# Patient Record
Sex: MALE | Race: WHITE | NOT HISPANIC OR LATINO | Employment: OTHER | URBAN - METROPOLITAN AREA
[De-identification: names, ages, dates, MRNs, and addresses within clinical notes are randomized per-mention and may not be internally consistent; named-entity substitution may affect disease eponyms.]

---

## 2024-01-10 ENCOUNTER — OFFICE VISIT (OUTPATIENT)
Dept: PRIMARY CARE | Facility: CLINIC | Age: 74
End: 2024-01-10
Payer: MEDICARE

## 2024-01-10 VITALS
BODY MASS INDEX: 24.25 KG/M2 | OXYGEN SATURATION: 95 % | WEIGHT: 160 LBS | HEIGHT: 68 IN | HEART RATE: 83 BPM | DIASTOLIC BLOOD PRESSURE: 83 MMHG | SYSTOLIC BLOOD PRESSURE: 122 MMHG

## 2024-01-10 DIAGNOSIS — K21.9 GASTROESOPHAGEAL REFLUX DISEASE WITHOUT ESOPHAGITIS: ICD-10-CM

## 2024-01-10 DIAGNOSIS — G47.33 OSA (OBSTRUCTIVE SLEEP APNEA): ICD-10-CM

## 2024-01-10 DIAGNOSIS — Z79.4 TYPE 2 DIABETES MELLITUS WITH OTHER SPECIFIED COMPLICATION, WITH LONG-TERM CURRENT USE OF INSULIN (MULTI): ICD-10-CM

## 2024-01-10 DIAGNOSIS — Z00.00 ROUTINE GENERAL MEDICAL EXAMINATION AT HEALTH CARE FACILITY: ICD-10-CM

## 2024-01-10 DIAGNOSIS — E11.69 TYPE 2 DIABETES MELLITUS WITH OTHER SPECIFIED COMPLICATION, WITH LONG-TERM CURRENT USE OF INSULIN (MULTI): ICD-10-CM

## 2024-01-10 DIAGNOSIS — E08.00 DIAB D/T UNDRL COND W HYPROSM W/O NONKET HYPRGLY-HYPROS COMA (MULTI): ICD-10-CM

## 2024-01-10 DIAGNOSIS — I25.810 CORONARY ARTERY DISEASE INVOLVING AUTOLOGOUS VEIN CORONARY BYPASS GRAFT WITHOUT ANGINA PECTORIS: ICD-10-CM

## 2024-01-10 DIAGNOSIS — E55.9 VITAMIN D DEFICIENCY: ICD-10-CM

## 2024-01-10 DIAGNOSIS — G63 POLYNEUROPATHY ASSOCIATED WITH UNDERLYING DISEASE (MULTI): ICD-10-CM

## 2024-01-10 DIAGNOSIS — Z00.00 MEDICARE ANNUAL WELLNESS VISIT, SUBSEQUENT: Primary | ICD-10-CM

## 2024-01-10 PROBLEM — E11.9 DIABETES (MULTI): Status: ACTIVE | Noted: 2024-01-10

## 2024-01-10 PROBLEM — M50.00 CERVICAL DISC DISORDER WITH MYELOPATHY: Status: ACTIVE | Noted: 2024-01-10

## 2024-01-10 PROBLEM — M79.673 FOOT PAIN: Status: ACTIVE | Noted: 2024-01-10

## 2024-01-10 PROBLEM — Z95.1 S/P CABG X 4: Status: ACTIVE | Noted: 2024-01-10

## 2024-01-10 PROBLEM — R53.1 GENERALIZED WEAKNESS: Status: ACTIVE | Noted: 2024-01-10

## 2024-01-10 PROBLEM — M54.16 LUMBAR RADICULAR PAIN: Status: ACTIVE | Noted: 2024-01-10

## 2024-01-10 PROBLEM — I25.10 CAD (CORONARY ARTERY DISEASE): Status: ACTIVE | Noted: 2024-01-10

## 2024-01-10 PROBLEM — G62.9 PERIPHERAL NEUROPATHY: Status: ACTIVE | Noted: 2024-01-10

## 2024-01-10 PROBLEM — R29.898 WEAKNESS OF BOTH LOWER EXTREMITIES: Status: ACTIVE | Noted: 2024-01-10

## 2024-01-10 PROBLEM — M79.604 PAIN IN LATERAL RIGHT LOWER EXTREMITY: Status: ACTIVE | Noted: 2024-01-10

## 2024-01-10 PROCEDURE — 1170F FXNL STATUS ASSESSED: CPT | Performed by: NURSE PRACTITIONER

## 2024-01-10 PROCEDURE — 3074F SYST BP LT 130 MM HG: CPT | Performed by: NURSE PRACTITIONER

## 2024-01-10 PROCEDURE — 1159F MED LIST DOCD IN RCRD: CPT | Performed by: NURSE PRACTITIONER

## 2024-01-10 PROCEDURE — G0439 PPPS, SUBSEQ VISIT: HCPCS | Performed by: NURSE PRACTITIONER

## 2024-01-10 PROCEDURE — 3079F DIAST BP 80-89 MM HG: CPT | Performed by: NURSE PRACTITIONER

## 2024-01-10 PROCEDURE — 99214 OFFICE O/P EST MOD 30 MIN: CPT | Performed by: NURSE PRACTITIONER

## 2024-01-10 PROCEDURE — 1036F TOBACCO NON-USER: CPT | Performed by: NURSE PRACTITIONER

## 2024-01-10 RX ORDER — HUMAN INSULIN 100 [USP'U]/ML
INJECTION, SUSPENSION SUBCUTANEOUS
COMMUNITY
Start: 2021-07-29 | End: 2024-01-10 | Stop reason: ALTCHOICE

## 2024-01-10 RX ORDER — NAPROXEN SODIUM 220 MG/1
81 TABLET, FILM COATED ORAL DAILY
Qty: 30 TABLET | Refills: 11 | Status: SHIPPED | OUTPATIENT
Start: 2024-01-10 | End: 2025-01-09

## 2024-01-10 RX ORDER — LANOLIN ALCOHOL/MO/W.PET/CERES
1000 CREAM (GRAM) TOPICAL
COMMUNITY
End: 2024-02-07 | Stop reason: ALTCHOICE

## 2024-01-10 RX ORDER — OMEPRAZOLE 20 MG/1
20 CAPSULE, DELAYED RELEASE ORAL DAILY
Qty: 30 CAPSULE | Refills: 5 | Status: SHIPPED | OUTPATIENT
Start: 2024-01-10 | End: 2024-07-08

## 2024-01-10 RX ORDER — GABAPENTIN 100 MG/1
100 CAPSULE ORAL 3 TIMES DAILY PRN
COMMUNITY
End: 2024-01-10 | Stop reason: SDUPTHER

## 2024-01-10 RX ORDER — METFORMIN HYDROCHLORIDE 500 MG/1
500 TABLET ORAL
Qty: 90 TABLET | Refills: 11 | Status: SHIPPED | OUTPATIENT
Start: 2024-01-10 | End: 2024-02-07 | Stop reason: SDUPTHER

## 2024-01-10 RX ORDER — GABAPENTIN 100 MG/1
100 CAPSULE ORAL 3 TIMES DAILY
Qty: 90 CAPSULE | Refills: 1 | Status: SHIPPED | OUTPATIENT
Start: 2024-01-10 | End: 2024-04-10 | Stop reason: SDUPTHER

## 2024-01-10 ASSESSMENT — ENCOUNTER SYMPTOMS
DIARRHEA: 0
CHILLS: 0
SHORTNESS OF BREATH: 0
FEVER: 0
VOICE CHANGE: 1
FATIGUE: 1
SORE THROAT: 1
NECK PAIN: 1
NAUSEA: 0
PALPITATIONS: 0
WEAKNESS: 1
CONSTIPATION: 0
PSYCHIATRIC NEGATIVE: 1
VOMITING: 0
COUGH: 0
ABDOMINAL PAIN: 0
NUMBNESS: 1
ARTHRALGIAS: 1
DIZZINESS: 0

## 2024-01-10 ASSESSMENT — ACTIVITIES OF DAILY LIVING (ADL)
GROCERY_SHOPPING: INDEPENDENT
DOING_HOUSEWORK: INDEPENDENT
BATHING: INDEPENDENT
DRESSING: INDEPENDENT
TAKING_MEDICATION: INDEPENDENT
MANAGING_FINANCES: INDEPENDENT

## 2024-01-10 ASSESSMENT — PATIENT HEALTH QUESTIONNAIRE - PHQ9
2. FEELING DOWN, DEPRESSED OR HOPELESS: NOT AT ALL
1. LITTLE INTEREST OR PLEASURE IN DOING THINGS: NOT AT ALL
SUM OF ALL RESPONSES TO PHQ9 QUESTIONS 1 AND 2: 0

## 2024-01-10 NOTE — ASSESSMENT & PLAN NOTE
Stop Insulin  Start metformin and jardiance as prescribed  Continue to check sugar atleast twice daily and record.

## 2024-01-10 NOTE — PROGRESS NOTES
"Subjective   Patient ID: Jay Rebolledo is a 73 y.o. male who presents for medicare wellness and annual exam.      HPI   Needs to have back surgery.  Will not perform procedure with elevated A1c.  Here today with wife.  Complaints of feeling tired, generally not feeling good, low energy.  Denies chest pain, palpitations, dizziness,  or GI issues.  Does have some exertional SOB. Not all the time.  States he has a sore, raspy throat for about 3 weeks.  Does not bother him when swallowing food or drink, only involuntary swallowing.  Both feet have been bothering him for a while now, numbness and tingling.  Taking insulin for sugar, not well controlled, lots of highs and lows.  Has not been taking any medications for some time, does not like taking pills.  Using heating pad on his feet for comfort from the N/T.  Not compliant with CPAP    Review of Systems   Constitutional:  Positive for fatigue. Negative for chills and fever.   HENT:  Positive for sore throat and voice change. Negative for congestion and ear pain.    Eyes:  Negative for visual disturbance.   Respiratory:  Negative for cough and shortness of breath.    Cardiovascular:  Negative for chest pain, palpitations and leg swelling.   Gastrointestinal:  Negative for abdominal pain, constipation, diarrhea, nausea and vomiting.   Genitourinary: Negative.    Musculoskeletal:  Positive for arthralgias and neck pain.   Skin:  Negative for rash.   Neurological:  Positive for weakness and numbness. Negative for dizziness.   Psychiatric/Behavioral: Negative.         Objective   /83   Pulse 83   Ht 1.727 m (5' 8\")   Wt 72.6 kg (160 lb)   SpO2 95%   BMI 24.33 kg/m²     Physical Exam  Constitutional:       General: He is not in acute distress.     Appearance: Normal appearance.   HENT:      Head: Normocephalic and atraumatic.      Right Ear: Tympanic membrane, ear canal and external ear normal.      Left Ear: Tympanic membrane, ear canal and external ear normal. "      Nose: Nose normal.      Mouth/Throat:      Mouth: Mucous membranes are moist.      Pharynx: Oropharynx is clear.   Eyes:      Extraocular Movements: Extraocular movements intact.      Conjunctiva/sclera: Conjunctivae normal.      Pupils: Pupils are equal, round, and reactive to light.   Cardiovascular:      Rate and Rhythm: Normal rate and regular rhythm.      Pulses: Normal pulses.      Heart sounds: Normal heart sounds. No murmur heard.  Pulmonary:      Effort: Pulmonary effort is normal.      Breath sounds: Normal breath sounds. No wheezing, rhonchi or rales.   Abdominal:      General: Bowel sounds are normal.      Palpations: Abdomen is soft.      Tenderness: There is no abdominal tenderness.   Musculoskeletal:         General: Normal range of motion.      Cervical back: Normal range of motion and neck supple.   Lymphadenopathy:      Comments: No lymphadenopathy noted   Skin:     General: Skin is warm and dry.      Findings: No rash.   Neurological:      General: No focal deficit present.      Mental Status: He is alert and oriented to person, place, and time.      Cranial Nerves: No cranial nerve deficit.      Coordination: Coordination normal.      Gait: Gait normal.   Psychiatric:         Mood and Affect: Mood normal.         Behavior: Behavior normal.         Assessment/Plan   Problem List Items Addressed This Visit             ICD-10-CM    CAD (coronary artery disease) I25.10     Start Aspirin 81 mg daily         Relevant Medications    aspirin 81 mg chewable tablet    Diabetes (CMS/HCC) E11.9     Stop Insulin  Start metformin and jardiance as prescribed  Continue to check sugar atleast twice daily and record.         CAYLA (obstructive sleep apnea) G47.33    Peripheral neuropathy G62.9     Continue neurontin as prescribed, as needed  DO NOT use heating pad on feet.  May cause burns.  Utilize other options we discussed, warming socks, warm blanket         Relevant Medications    gabapentin (Neurontin)  100 mg capsule    Medicare annual wellness visit, subsequent - Primary Z00.00    Gastroesophageal reflux disease without esophagitis K21.9     Start omeprazole as prescribed.         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

## 2024-01-10 NOTE — ASSESSMENT & PLAN NOTE
Continue neurontin as prescribed, as needed  DO NOT use heating pad on feet.  May cause burns.  Utilize other options we discussed, warming socks, warm blanket

## 2024-01-11 ENCOUNTER — HOSPITAL ENCOUNTER (EMERGENCY)
Facility: HOSPITAL | Age: 74
Discharge: HOME | End: 2024-01-11
Attending: STUDENT IN AN ORGANIZED HEALTH CARE EDUCATION/TRAINING PROGRAM
Payer: MEDICARE

## 2024-01-11 ENCOUNTER — NURSE TRIAGE (OUTPATIENT)
Dept: UROLOGY | Facility: CLINIC | Age: 74
End: 2024-01-11

## 2024-01-11 VITALS
BODY MASS INDEX: 24.25 KG/M2 | HEIGHT: 68 IN | HEART RATE: 87 BPM | DIASTOLIC BLOOD PRESSURE: 69 MMHG | RESPIRATION RATE: 18 BRPM | SYSTOLIC BLOOD PRESSURE: 125 MMHG | WEIGHT: 160 LBS | OXYGEN SATURATION: 97 %

## 2024-01-11 DIAGNOSIS — K59.00 CONSTIPATION, UNSPECIFIED CONSTIPATION TYPE: ICD-10-CM

## 2024-01-11 DIAGNOSIS — R33.9 URINARY RETENTION: Primary | ICD-10-CM

## 2024-01-11 LAB
ANION GAP BLDV CALCULATED.4IONS-SCNC: 13 MMOL/L (ref 10–25)
ANION GAP SERPL CALC-SCNC: 14 MMOL/L (ref 10–20)
BASE EXCESS BLDV CALC-SCNC: 1.6 MMOL/L (ref -2–3)
BASOPHILS # BLD AUTO: 0.08 X10*3/UL (ref 0–0.1)
BASOPHILS NFR BLD AUTO: 0.6 %
BODY TEMPERATURE: ABNORMAL
BUN SERPL-MCNC: 21 MG/DL (ref 6–23)
CA-I BLDV-SCNC: 1.06 MMOL/L (ref 1.1–1.33)
CALCIUM SERPL-MCNC: 8.4 MG/DL (ref 8.6–10.3)
CHLORIDE BLDV-SCNC: 94 MMOL/L (ref 98–107)
CHLORIDE SERPL-SCNC: 100 MMOL/L (ref 98–107)
CO2 SERPL-SCNC: 24 MMOL/L (ref 21–32)
CREAT SERPL-MCNC: 0.93 MG/DL (ref 0.5–1.3)
EGFRCR SERPLBLD CKD-EPI 2021: 88 ML/MIN/1.73M*2
EOSINOPHIL # BLD AUTO: 0.14 X10*3/UL (ref 0–0.4)
EOSINOPHIL NFR BLD AUTO: 1.1 %
ERYTHROCYTE [DISTWIDTH] IN BLOOD BY AUTOMATED COUNT: 12.9 % (ref 11.5–14.5)
GLUCOSE BLD MANUAL STRIP-MCNC: 419 MG/DL (ref 74–99)
GLUCOSE BLD MANUAL STRIP-MCNC: 470 MG/DL (ref 74–99)
GLUCOSE BLDV-MCNC: 547 MG/DL (ref 74–99)
GLUCOSE SERPL-MCNC: 443 MG/DL (ref 74–99)
HCO3 BLDV-SCNC: 26.6 MMOL/L (ref 22–26)
HCT VFR BLD AUTO: 47.7 % (ref 41–52)
HCT VFR BLD EST: 58 % (ref 41–52)
HGB BLD-MCNC: 15.7 G/DL (ref 13.5–17.5)
HGB BLDV-MCNC: 19.3 G/DL (ref 13.5–17.5)
IMM GRANULOCYTES # BLD AUTO: 0.05 X10*3/UL (ref 0–0.5)
IMM GRANULOCYTES NFR BLD AUTO: 0.4 % (ref 0–0.9)
INHALED O2 CONCENTRATION: 0 %
LACTATE BLDV-SCNC: 3.3 MMOL/L (ref 0.4–2)
LACTATE SERPL-SCNC: 1.8 MMOL/L (ref 0.4–2)
LYMPHOCYTES # BLD AUTO: 1.98 X10*3/UL (ref 0.8–3)
LYMPHOCYTES NFR BLD AUTO: 15.3 %
MCH RBC QN AUTO: 28.9 PG (ref 26–34)
MCHC RBC AUTO-ENTMCNC: 32.9 G/DL (ref 32–36)
MCV RBC AUTO: 88 FL (ref 80–100)
MONOCYTES # BLD AUTO: 0.82 X10*3/UL (ref 0.05–0.8)
MONOCYTES NFR BLD AUTO: 6.4 %
NEUTROPHILS # BLD AUTO: 9.84 X10*3/UL (ref 1.6–5.5)
NEUTROPHILS NFR BLD AUTO: 76.2 %
NRBC BLD-RTO: 0 /100 WBCS (ref 0–0)
OXYHGB MFR BLDV: 82.5 % (ref 45–75)
PCO2 BLDV: 42 MM HG (ref 41–51)
PH BLDV: 7.41 PH (ref 7.33–7.43)
PLATELET # BLD AUTO: 178 X10*3/UL (ref 150–450)
PO2 BLDV: 54 MM HG (ref 35–45)
POTASSIUM BLDV-SCNC: 9 MMOL/L (ref 3.5–5.3)
POTASSIUM SERPL-SCNC: 5 MMOL/L (ref 3.5–5.3)
RBC # BLD AUTO: 5.44 X10*6/UL (ref 4.5–5.9)
SAO2 % BLDV: 84 % (ref 45–75)
SODIUM BLDV-SCNC: 125 MMOL/L (ref 136–145)
SODIUM SERPL-SCNC: 133 MMOL/L (ref 136–145)
WBC # BLD AUTO: 12.9 X10*3/UL (ref 4.4–11.3)

## 2024-01-11 PROCEDURE — 82947 ASSAY GLUCOSE BLOOD QUANT: CPT | Mod: 59

## 2024-01-11 PROCEDURE — 99283 EMERGENCY DEPT VISIT LOW MDM: CPT | Mod: 25

## 2024-01-11 PROCEDURE — 85025 COMPLETE CBC W/AUTO DIFF WBC: CPT | Performed by: STUDENT IN AN ORGANIZED HEALTH CARE EDUCATION/TRAINING PROGRAM

## 2024-01-11 PROCEDURE — 36415 COLL VENOUS BLD VENIPUNCTURE: CPT | Performed by: STUDENT IN AN ORGANIZED HEALTH CARE EDUCATION/TRAINING PROGRAM

## 2024-01-11 PROCEDURE — 83605 ASSAY OF LACTIC ACID: CPT | Performed by: STUDENT IN AN ORGANIZED HEALTH CARE EDUCATION/TRAINING PROGRAM

## 2024-01-11 PROCEDURE — 84132 ASSAY OF SERUM POTASSIUM: CPT | Performed by: STUDENT IN AN ORGANIZED HEALTH CARE EDUCATION/TRAINING PROGRAM

## 2024-01-11 PROCEDURE — 2500000002 HC RX 250 W HCPCS SELF ADMINISTERED DRUGS (ALT 637 FOR MEDICARE OP, ALT 636 FOR OP/ED): Performed by: STUDENT IN AN ORGANIZED HEALTH CARE EDUCATION/TRAINING PROGRAM

## 2024-01-11 PROCEDURE — 51798 US URINE CAPACITY MEASURE: CPT

## 2024-01-11 PROCEDURE — 2500000005 HC RX 250 GENERAL PHARMACY W/O HCPCS: Performed by: STUDENT IN AN ORGANIZED HEALTH CARE EDUCATION/TRAINING PROGRAM

## 2024-01-11 PROCEDURE — 2500000004 HC RX 250 GENERAL PHARMACY W/ HCPCS (ALT 636 FOR OP/ED): Performed by: STUDENT IN AN ORGANIZED HEALTH CARE EDUCATION/TRAINING PROGRAM

## 2024-01-11 PROCEDURE — 96374 THER/PROPH/DIAG INJ IV PUSH: CPT

## 2024-01-11 PROCEDURE — 51702 INSERT TEMP BLADDER CATH: CPT

## 2024-01-11 PROCEDURE — 99284 EMERGENCY DEPT VISIT MOD MDM: CPT | Performed by: STUDENT IN AN ORGANIZED HEALTH CARE EDUCATION/TRAINING PROGRAM

## 2024-01-11 PROCEDURE — 96361 HYDRATE IV INFUSION ADD-ON: CPT

## 2024-01-11 RX ORDER — LIDOCAINE HYDROCHLORIDE 20 MG/ML
1 JELLY TOPICAL ONCE
Status: DISCONTINUED | OUTPATIENT
Start: 2024-01-11 | End: 2024-01-11 | Stop reason: HOSPADM

## 2024-01-11 RX ADMIN — SODIUM CHLORIDE 1000 ML: 9 INJECTION, SOLUTION INTRAVENOUS at 02:58

## 2024-01-11 RX ADMIN — SODIUM PHOSPHATE 1 ENEMA: 7; 19 ENEMA RECTAL at 02:08

## 2024-01-11 RX ADMIN — INSULIN HUMAN 5 UNITS: 100 INJECTION, SOLUTION PARENTERAL at 04:12

## 2024-01-11 ASSESSMENT — PAIN - FUNCTIONAL ASSESSMENT: PAIN_FUNCTIONAL_ASSESSMENT: 0-10

## 2024-01-11 ASSESSMENT — LIFESTYLE VARIABLES
HAVE YOU EVER FELT YOU SHOULD CUT DOWN ON YOUR DRINKING: NO
REASON UNABLE TO ASSESS: NO
EVER HAD A DRINK FIRST THING IN THE MORNING TO STEADY YOUR NERVES TO GET RID OF A HANGOVER: NO
HAVE PEOPLE ANNOYED YOU BY CRITICIZING YOUR DRINKING: NO
EVER FELT BAD OR GUILTY ABOUT YOUR DRINKING: NO

## 2024-01-11 ASSESSMENT — COLUMBIA-SUICIDE SEVERITY RATING SCALE - C-SSRS
6. HAVE YOU EVER DONE ANYTHING, STARTED TO DO ANYTHING, OR PREPARED TO DO ANYTHING TO END YOUR LIFE?: NO
2. HAVE YOU ACTUALLY HAD ANY THOUGHTS OF KILLING YOURSELF?: NO
1. IN THE PAST MONTH, HAVE YOU WISHED YOU WERE DEAD OR WISHED YOU COULD GO TO SLEEP AND NOT WAKE UP?: NO

## 2024-01-11 ASSESSMENT — PAIN SCALES - GENERAL: PAINLEVEL_OUTOF10: 10 - WORST POSSIBLE PAIN

## 2024-01-11 NOTE — ED NOTES
Pt came into the ED tonight due to he is having severe ABD pain-  He states he has not had a BM for 2 days and drank a lot of water trying to get things moving and now he is not able to urinate.       Cristian Vines RN  01/11/24 0116

## 2024-01-11 NOTE — TELEPHONE ENCOUNTER
Regarding: ER FOLLOW UP  ----- Message from Denise Cantu sent at 1/11/2024  8:25 AM EST -----  PATIENT WAS IN ER LAST NIGHT, HE WAS INSTRUCTED TO BE SEEN TODAY BY DR. YE. CAN YOU PLEASE CALL PATIENT AND GET HIM ON THE SCHEDULE?

## 2024-01-11 NOTE — ED PROVIDER NOTES
History/Exam limitations: none  HPI was provided by patient    HPI:    Chief Complaint   Patient presents with    Urinary Retention        Jay Rebolledo is a 73 y.o. male presents with chief complaint of hypertension.  Patient has not been able to urinate today.  This occurred just prior to arrival.  Admits to some suprapubic tenderness.  Also been having trouble with constipation for the past 2 days.  Denies any back pain any perianal anesthesia.  Has not tried anything prior to arrival.     ROS:All other review of systems are negative except as noted above and HPI or ROS.   CONSTITUTIONAL: fever, chills  CARDIOVASCULAR: chest pain, palpitations, swelling  RESPIRATORY: cough, wheeze, shortness of breath  GI: nausea, vomiting, diarrhea, abdominal pain  GENITOURINARY: dysuria, hematuria, frequency  SKIN: rash, lesion  NEUROLOGIC: headache, numbness, focal weakness  NOTES: All systems reviewed, negative except as described above       Physical Exam:  GENERAL: Alert, oriented , cooperative    HEAD: normocephalic, atraumatic    SKIN:  dry skin, no lesions.     NECK: Supple, without meningismus. Trachea at midline. No lymphadenopathy.    PULMONARY: Clear bilaterally. No crackles, rhonchi, wheezing.  No respiratory distress.  No work of breathing.    CARDIAC: Regular rate and regular rhythm.  Pulses 2+ in radials and dorsal pedal pulses bilaterally.  No murmur, rub, gallop.  No edema.    ABDOMEN: Soft, prepubic tenderness to palpation, active bowel sounds.  No palpable organomegaly.  No rebound or guarding.  No CVA tenderness.  No pulsatile masses.    NEUROLOGICAL:   no focal neuro deficits.  neurovascular intact in bilateral upper and lower extremities    PSYCHIATRIC: Appropriate mood and affect. Calm.       MDM/ED COURSE:        The patient presented for evaluation of urinary retention.  Bladder scan performed had about 500 cc of fluid.  Urinary catheter placed by nursing staff and had complete resolution of his bladder's  last abdominal pain symptoms.  He wanted to try a Fleet enema here for his constipation.  He was given.  The patient  has stable vs and will be discharge home.   The patient and caregiver are in agreement with the plan and given instructions to follow up with urology and PCP       External Records Reviewed: I reviewed recent and relevant outside records      I discussed the differential, results and plan with the patient and/or family/friend/caregiver if present.       I emphasized the importance of follow-up with the physician I referred them to in the timeframe recommended.  I explained reasons for the patient to return to the Emergency Department. Additional verbal discharge instructions were also given and discussed with the patient to supplement those generated by the EMR. We also discussed medications that were prescribed (if any) including common side effects and interactions. The patient was advised to abstain from driving, operating heavy machinery or making significant decisions while taking medications such as opiates and muscle relaxers that may impair this. All questions were addressed.  They understand return precautions and discharge instructions. The patient and/or family/friend/caregiver expressed understanding.     Note: This note was dictated by speech recognition. Minor errors in transcription may be present.    ED Course as of 01/11/24 0418   Thu Jan 11, 2024   0232 After getting an enema.  He then stated he did not feel well blood glucose was checked and he was found to be upwards of 470.  Will get blood work to check if he is in DKA. [WL]   0358 Was found to be hyperglycemic here but not in DKA.  Will give him a bolus of fluid along with 5 units of insulin.  Advised him to follow-up with his primary care doctor to have his blood sugar and medication regimen rechecked. [WL]   0418 Patient on reevaluation states he is ready to go home.  Potassium VBG is elevated but likely hemolyzed as the  actual potassium is not elevated on BMP. [WL]      ED Course User Index  [WL] Curly Fleming DO         Diagnoses as of 01/11/24 0418   Urinary retention   Constipation, unspecified constipation type         Past Medical History:   Diagnosis Date    Other conditions influencing health status 03/03/2022    History of cough      Social History     Socioeconomic History    Marital status:      Spouse name: Not on file    Number of children: Not on file    Years of education: Not on file    Highest education level: Not on file   Occupational History    Not on file   Tobacco Use    Smoking status: Never    Smokeless tobacco: Never   Vaping Use    Vaping Use: Never used   Substance and Sexual Activity    Alcohol use: Yes     Comment: socially    Drug use: Never    Sexual activity: Not on file   Other Topics Concern    Not on file   Social History Narrative    Not on file     Social Determinants of Health     Financial Resource Strain: Not on file   Food Insecurity: Not on file   Transportation Needs: Not on file   Physical Activity: Not on file   Stress: Not on file   Social Connections: Not on file   Intimate Partner Violence: Not on file   Housing Stability: Not on file     Current Outpatient Medications   Medication Instructions    aspirin 81 mg, oral, Daily    bisacodyl (FLEET BISACODYL) 10 mg, rectal, Once    cyanocobalamin (VITAMIN B-12) 1,000 mcg, oral, Daily RT    empagliflozin (JARDIANCE) 10 mg, oral, Daily    gabapentin (NEURONTIN) 100 mg, oral, 3 times daily    metFORMIN (GLUCOPHAGE) 500 mg, oral, 3 times daily with meals    omeprazole (PRILOSEC) 20 mg, oral, Daily, Do not crush or chew.     Allergies   Allergen Reactions    Penicillins Hives and Rash     and sycope as a child             ED Triage Vitals [01/11/24 0047]   Temp Heart Rate Resp BP   -- 87 18 (!) 161/98      SpO2 Temp src Heart Rate Source Patient Position   96 % -- -- --      BP Location FiO2 (%)     -- --               Labs and  Imaging  No orders to display     Labs Reviewed   CBC WITH AUTO DIFFERENTIAL - Abnormal       Result Value    WBC 12.9 (*)     nRBC 0.0      RBC 5.44      Hemoglobin 15.7      Hematocrit 47.7      MCV 88      MCH 28.9      MCHC 32.9      RDW 12.9      Platelets 178      Neutrophils % 76.2      Immature Granulocytes %, Automated 0.4      Lymphocytes % 15.3      Monocytes % 6.4      Eosinophils % 1.1      Basophils % 0.6      Neutrophils Absolute 9.84 (*)     Immature Granulocytes Absolute, Automated 0.05      Lymphocytes Absolute 1.98      Monocytes Absolute 0.82 (*)     Eosinophils Absolute 0.14      Basophils Absolute 0.08     BASIC METABOLIC PANEL - Abnormal    Glucose 443 (*)     Sodium 133 (*)     Potassium 5.0      Chloride 100      Bicarbonate 24      Anion Gap 14      Urea Nitrogen 21      Creatinine 0.93      eGFR 88      Calcium 8.4 (*)    BLOOD GAS VENOUS FULL PANEL - Abnormal    POCT pH, Venous 7.41      POCT pCO2, Venous 42      POCT pO2, Venous 54 (*)     POCT SO2, Venous 84 (*)     POCT Oxy Hemoglobin, Venous 82.5 (*)     POCT Hematocrit Calculated, Venous 58.0 (*)     POCT Sodium, Venous 125 (*)     POCT Potassium, Venous 9.0 (*)     POCT Chloride, Venous 94 (*)     POCT Ionized Calicum, Venous 1.06 (*)     POCT Glucose, Venous 547 (*)     POCT Lactate, Venous 3.3 (*)     POCT Base Excess, Venous 1.6      POCT HCO3 Calculated, Venous 26.6 (*)     POCT Hemoglobin, Venous 19.3 (*)     POCT Anion Gap, Venous 13.0      Patient Temperature        FiO2 0     POCT GLUCOSE - Abnormal    POCT Glucose 470 (*)    POCT GLUCOSE - Abnormal    POCT Glucose 419 (*)    LACTATE - Normal    Lactate 1.8      Narrative:     Venipuncture immediately after or during the administration of Metamizole may lead to falsely low results. Testing should be performed immediately  prior to Metamizole dosing.   BLOOD GAS LACTIC ACID, VENOUS   POCT FINGERSTICK GLUCOSE           Procedure  Procedures                  Curly Fleming,  DO  01/11/24 0418

## 2024-01-12 ENCOUNTER — OFFICE VISIT (OUTPATIENT)
Dept: UROLOGY | Facility: CLINIC | Age: 74
End: 2024-01-12
Payer: MEDICARE

## 2024-01-12 DIAGNOSIS — N40.1 BENIGN PROSTATIC HYPERPLASIA WITH URINARY RETENTION: Primary | ICD-10-CM

## 2024-01-12 DIAGNOSIS — R33.9 URINE RETENTION: ICD-10-CM

## 2024-01-12 DIAGNOSIS — R33.8 BENIGN PROSTATIC HYPERPLASIA WITH URINARY RETENTION: Primary | ICD-10-CM

## 2024-01-12 PROCEDURE — 99204 OFFICE O/P NEW MOD 45 MIN: CPT | Performed by: STUDENT IN AN ORGANIZED HEALTH CARE EDUCATION/TRAINING PROGRAM

## 2024-01-12 PROCEDURE — 1159F MED LIST DOCD IN RCRD: CPT | Performed by: STUDENT IN AN ORGANIZED HEALTH CARE EDUCATION/TRAINING PROGRAM

## 2024-01-12 PROCEDURE — 1125F AMNT PAIN NOTED PAIN PRSNT: CPT | Performed by: STUDENT IN AN ORGANIZED HEALTH CARE EDUCATION/TRAINING PROGRAM

## 2024-01-12 PROCEDURE — 1036F TOBACCO NON-USER: CPT | Performed by: STUDENT IN AN ORGANIZED HEALTH CARE EDUCATION/TRAINING PROGRAM

## 2024-01-12 PROCEDURE — 51798 US URINE CAPACITY MEASURE: CPT | Performed by: STUDENT IN AN ORGANIZED HEALTH CARE EDUCATION/TRAINING PROGRAM

## 2024-01-12 RX ORDER — TAMSULOSIN HYDROCHLORIDE 0.4 MG/1
0.4 CAPSULE ORAL DAILY
Qty: 30 CAPSULE | Refills: 11 | Status: SHIPPED | OUTPATIENT
Start: 2024-01-12 | End: 2024-01-26 | Stop reason: SDUPTHER

## 2024-01-12 NOTE — PROGRESS NOTES
Subjective   Patient ID: Jay Rebolledo is a 73 y.o. male who presents for urinary retention.  HPI  73 y.o. male who presents for urinary retention.     He was seen on ED on 1/11/24 with HTN and inability to urinate. Had some suprapubic tenderness. Had constipation x 2 days treated with enema. Was also hyperglycemic.     Bladder scan performed had about 500 cc of fluid.  Urinary catheter placed by nursing staff with resolution of symptoms. He has cath in place at this time.      He was not started on prostate medication.   Review of Systems  A complete review of systems was performed. All systems are noted to be negative unless indicated in the history of present illness, impression, active problem list, or past histories.     Objective   Physical Exam  General: Well developed, well nourished, alert and cooperative, appears in no acute distress  Eyes: Non-injected conjunctiva, sclera clear, no proptosis  Cardiac: Extremities are warm and well perfused. No edema, cyanosis or pallor.   Lungs: Breathing is easy, non-labored. Speaking in clear and complete sentences. Normal diaphragmatic movement.  Abdomen: soft, non-tender  MSK: Ambulatory with steady gait, unassisted  Neuro: alert and oriented to person, place and time  Psych: Demonstrates good judgement and reason, without hallucinations, abnormal affect or abnormal behaviors.  Skin: no obvious lesions, no rashes.  : mohr in place.     Procedure:  300 ml NSS instilled in bladder through Mohr catheter  Catheter removed, patient voided freely, no residue    Assessment/Plan   73 y.o. male who presents for urinary retention.     He was seen on ED on 1/11/24 with HTN and inability to urinate. Had some suprapubic tenderness. Had constipation x 2 days treated with enema. Was also hyperglycemic.     Bladder scan performed had about 500 cc of fluid.  Urinary catheter placed by nursing staff with resolution of symptoms. He has cath in place at this time.      I discussed with  the patient the etiology and pathophysiology of his symptoms, related to the anatomical and functional relationship of the bladder and the prostate.    I explained benign prostatic hyperplasia leads to the enlargement of the prostate gland in a circumferential direction, either to the outside, the inside where it starts impinging on the bladder, or even inside the bladder forming a median lobe that is more difficult to treat medically because of the anatomical component.    The resulting symptoms do not necessarily correlate with the size of the prostate, and could be a combination of voiding/obstructive symptoms (hesitancy, weak stream, dribbling, and incomplete voiding of the bladder) and storage/irritative symptoms (frequency, urgency, urge incontinence, nocturia). While the typical treatment includes alpha blockers which work by blocking alpha-1 receptors in the smooth musculature of the prostate and bladder neck, this treatment could be combined with other therapies depending on the symptomatic profile and response. These include anticholinergics, 5 alpha reductase inhibitors proven to work best on prostates over the size of 40 g, and PDE 5 inhibitors using daily low-dose which have an anti-inflammatory and relaxing effect on the prostate, and are ideal for patients who have concomitant erectile dysfunction.    I discussed with him the side effect profile of alpha blockers, including headaches, lightheadedness typically with the first few doses, and retrograde ejaculation in minority of patients depending on the selectivity of the chosen alpha-blocker, more common in silodosin followed by tamsulosin and rare in alfuzosin and doxazosin.    Plan:  Voiding trial today successful.   Tamsulosin 0.4 mg daily at bedtime  Ultrasound kidneys/bladder/ pelvis for prostate sizing  FU in 4-6 weeks    Diagnoses and all orders for this visit:  Benign prostatic hyperplasia with urinary retention  Urine retention  -     US  bladder; Future  -     US renal complete; Future  -     tamsulosin (Flomax) 0.4 mg 24 hr capsule; Take 1 capsule (0.4 mg) by mouth once daily.      Scribe Attestation  By signing my name below, I, Jewell Mccarthy attest that this documentation has been prepared under the direction and in the presence of Paul Beckman MD.

## 2024-01-15 ENCOUNTER — HOSPITAL ENCOUNTER (EMERGENCY)
Facility: HOSPITAL | Age: 74
Discharge: HOME | End: 2024-01-15
Attending: EMERGENCY MEDICINE
Payer: MEDICARE

## 2024-01-15 VITALS
TEMPERATURE: 98.2 F | SYSTOLIC BLOOD PRESSURE: 140 MMHG | WEIGHT: 160.94 LBS | DIASTOLIC BLOOD PRESSURE: 89 MMHG | HEIGHT: 68 IN | RESPIRATION RATE: 18 BRPM | BODY MASS INDEX: 24.39 KG/M2 | OXYGEN SATURATION: 98 % | HEART RATE: 85 BPM

## 2024-01-15 DIAGNOSIS — N40.1 URINARY RETENTION DUE TO BENIGN PROSTATIC HYPERPLASIA: Primary | ICD-10-CM

## 2024-01-15 DIAGNOSIS — R33.8 URINARY RETENTION DUE TO BENIGN PROSTATIC HYPERPLASIA: Primary | ICD-10-CM

## 2024-01-15 DIAGNOSIS — K59.00 CONSTIPATION, UNSPECIFIED CONSTIPATION TYPE: ICD-10-CM

## 2024-01-15 LAB — GLUCOSE BLD MANUAL STRIP-MCNC: 335 MG/DL (ref 74–99)

## 2024-01-15 PROCEDURE — 82947 ASSAY GLUCOSE BLOOD QUANT: CPT

## 2024-01-15 PROCEDURE — 99284 EMERGENCY DEPT VISIT MOD MDM: CPT | Performed by: EMERGENCY MEDICINE

## 2024-01-15 PROCEDURE — 51702 INSERT TEMP BLADDER CATH: CPT | Performed by: EMERGENCY MEDICINE

## 2024-01-15 PROCEDURE — 99283 EMERGENCY DEPT VISIT LOW MDM: CPT

## 2024-01-15 ASSESSMENT — LIFESTYLE VARIABLES
REASON UNABLE TO ASSESS: NO
HAVE PEOPLE ANNOYED YOU BY CRITICIZING YOUR DRINKING: NO
EVER FELT BAD OR GUILTY ABOUT YOUR DRINKING: NO
EVER HAD A DRINK FIRST THING IN THE MORNING TO STEADY YOUR NERVES TO GET RID OF A HANGOVER: NO
HAVE YOU EVER FELT YOU SHOULD CUT DOWN ON YOUR DRINKING: NO

## 2024-01-15 ASSESSMENT — PAIN - FUNCTIONAL ASSESSMENT: PAIN_FUNCTIONAL_ASSESSMENT: 0-10

## 2024-01-15 ASSESSMENT — COLUMBIA-SUICIDE SEVERITY RATING SCALE - C-SSRS
2. HAVE YOU ACTUALLY HAD ANY THOUGHTS OF KILLING YOURSELF?: NO
1. IN THE PAST MONTH, HAVE YOU WISHED YOU WERE DEAD OR WISHED YOU COULD GO TO SLEEP AND NOT WAKE UP?: NO
6. HAVE YOU EVER DONE ANYTHING, STARTED TO DO ANYTHING, OR PREPARED TO DO ANYTHING TO END YOUR LIFE?: NO

## 2024-01-15 ASSESSMENT — PAIN DESCRIPTION - LOCATION: LOCATION: RECTUM

## 2024-01-15 ASSESSMENT — PAIN SCALES - GENERAL: PAINLEVEL_OUTOF10: 10 - WORST POSSIBLE PAIN

## 2024-01-15 NOTE — ED PROVIDER NOTES
HPI   Chief Complaint   Patient presents with    constipation     Pt is constipated and is now having difficulty with urination.       73-year-old male with history of diabetes and BPH presents to the ED for urinary retention.  Also complaining of constipation.  He reports his last bowel movement was 2 days ago.  He reports he takes MiraLAX and docusate.  He was in the ED several days ago and had a Barakat placed.  He went to urology appointment and they were able to remove Barakat and he was voiding up until this evening.                          Paras Coma Scale Score: 15                  Patient History   Past Medical History:   Diagnosis Date    Other conditions influencing health status 03/03/2022    History of cough     Past Surgical History:   Procedure Laterality Date    OTHER SURGICAL HISTORY  02/04/2019    Rotator cuff repair    OTHER SURGICAL HISTORY  02/04/2019    Percutaneous transluminal coronary angioplasty    OTHER SURGICAL HISTORY  02/04/2019    Coronary artery bypass graft    OTHER SURGICAL HISTORY  02/04/2019    Inguinal hernia repair     No family history on file.  Social History     Tobacco Use    Smoking status: Never    Smokeless tobacco: Never   Vaping Use    Vaping Use: Never used   Substance Use Topics    Alcohol use: Yes     Comment: socially    Drug use: Never       Physical Exam   ED Triage Vitals [01/15/24 0317]   Temp Heart Rate Resp BP   36 °C (96.8 °F) 93 18 (!) 153/92      SpO2 Temp src Heart Rate Source Patient Position   98 % -- -- --      BP Location FiO2 (%)     -- --       Physical Exam  Vitals and nursing note reviewed.   Constitutional:       Appearance: Normal appearance.   HENT:      Head: Normocephalic and atraumatic.   Eyes:      Extraocular Movements: Extraocular movements intact.      Pupils: Pupils are equal, round, and reactive to light.   Cardiovascular:      Rate and Rhythm: Normal rate and regular rhythm.   Pulmonary:      Effort: Pulmonary effort is normal.       Breath sounds: Normal breath sounds.   Abdominal:      General: Abdomen is flat.      Palpations: Abdomen is soft.      Tenderness: There is no abdominal tenderness.   Musculoskeletal:         General: Normal range of motion.      Cervical back: Normal range of motion and neck supple.   Skin:     General: Skin is warm and dry.      Capillary Refill: Capillary refill takes less than 2 seconds.   Neurological:      General: No focal deficit present.      Mental Status: He is alert and oriented to person, place, and time.   Psychiatric:         Mood and Affect: Mood normal.         Behavior: Behavior normal.         Thought Content: Thought content normal.         Judgment: Judgment normal.         ED Course & MDM   Diagnoses as of 01/15/24 0503   Urinary retention due to benign prostatic hyperplasia   Constipation, unspecified constipation type       Medical Decision Making  73-year-old male presents to the ED for urinary retention and constipation.  He had a recent ED visit for similar symptoms.  He is a diabetic.  Checked his blood sugar here which was in the 300s.  Very low suspicion for DKA.  Barakat was placed and patient had return of 600 cc of urine.  He received an enema and had a bowel movement.  He is on MiraLAX.  I encourage continuation of MiraLAX.  I also encourage increase fiber in his diet.  He is going to see his urologist in 1 day.  I recommend he keeps that appointment.  I have also given him outpatient follow-up for GI.        Procedure  Procedures     Bao Iraheta MD  01/15/24 0505

## 2024-01-16 ENCOUNTER — ANCILLARY PROCEDURE (OUTPATIENT)
Dept: RADIOLOGY | Facility: HOSPITAL | Age: 74
End: 2024-01-16
Payer: MEDICARE

## 2024-01-16 DIAGNOSIS — R33.9 URINE RETENTION: ICD-10-CM

## 2024-01-16 PROCEDURE — 76770 US EXAM ABDO BACK WALL COMP: CPT

## 2024-01-16 PROCEDURE — 76770 US EXAM ABDO BACK WALL COMP: CPT | Performed by: RADIOLOGY

## 2024-01-16 PROCEDURE — 76857 US EXAM PELVIC LIMITED: CPT

## 2024-01-16 PROCEDURE — 76857 US EXAM PELVIC LIMITED: CPT | Performed by: RADIOLOGY

## 2024-01-18 ENCOUNTER — OFFICE VISIT (OUTPATIENT)
Dept: GASTROENTEROLOGY | Facility: CLINIC | Age: 74
End: 2024-01-18
Payer: MEDICARE

## 2024-01-18 ENCOUNTER — HOSPITAL ENCOUNTER (OUTPATIENT)
Dept: RADIOLOGY | Facility: HOSPITAL | Age: 74
Discharge: HOME | End: 2024-01-18
Payer: MEDICARE

## 2024-01-18 VITALS
HEART RATE: 75 BPM | SYSTOLIC BLOOD PRESSURE: 142 MMHG | WEIGHT: 155 LBS | BODY MASS INDEX: 23.49 KG/M2 | HEIGHT: 68 IN | DIASTOLIC BLOOD PRESSURE: 86 MMHG

## 2024-01-18 DIAGNOSIS — Z12.11 SCREEN FOR COLON CANCER: Primary | ICD-10-CM

## 2024-01-18 DIAGNOSIS — K59.00 CONSTIPATION, UNSPECIFIED CONSTIPATION TYPE: ICD-10-CM

## 2024-01-18 PROCEDURE — 3079F DIAST BP 80-89 MM HG: CPT | Performed by: INTERNAL MEDICINE

## 2024-01-18 PROCEDURE — 74018 RADEX ABDOMEN 1 VIEW: CPT | Performed by: RADIOLOGY

## 2024-01-18 PROCEDURE — 1125F AMNT PAIN NOTED PAIN PRSNT: CPT | Performed by: INTERNAL MEDICINE

## 2024-01-18 PROCEDURE — 1159F MED LIST DOCD IN RCRD: CPT | Performed by: INTERNAL MEDICINE

## 2024-01-18 PROCEDURE — 1036F TOBACCO NON-USER: CPT | Performed by: INTERNAL MEDICINE

## 2024-01-18 PROCEDURE — 3077F SYST BP >= 140 MM HG: CPT | Performed by: INTERNAL MEDICINE

## 2024-01-18 PROCEDURE — 74018 RADEX ABDOMEN 1 VIEW: CPT

## 2024-01-18 PROCEDURE — 99203 OFFICE O/P NEW LOW 30 MIN: CPT | Performed by: INTERNAL MEDICINE

## 2024-01-18 RX ORDER — AMOXICILLIN 250 MG
1 CAPSULE ORAL DAILY
Qty: 30 TABLET | Refills: 11 | Status: SHIPPED | OUTPATIENT
Start: 2024-01-18 | End: 2025-01-17

## 2024-01-18 RX ORDER — POLYETHYLENE GLYCOL 3350 17 G/17G
17 POWDER, FOR SOLUTION ORAL DAILY
Qty: 3 PACKET | Refills: 0 | Status: SHIPPED | OUTPATIENT
Start: 2024-01-18 | End: 2024-01-21

## 2024-01-18 ASSESSMENT — ENCOUNTER SYMPTOMS
SHORTNESS OF BREATH: 0
HEMATURIA: 0
ABDOMINAL PAIN: 0
MYALGIAS: 0
CONSTIPATION: 1
VOMITING: 0
WHEEZING: 0
FEVER: 0
FREQUENCY: 0
DIFFICULTY URINATING: 1
CONFUSION: 0
LIGHT-HEADEDNESS: 0
ABDOMINAL DISTENTION: 0
DIZZINESS: 0
NAUSEA: 0
DYSURIA: 0
COUGH: 0
COLOR CHANGE: 0
CHILLS: 0
DIARRHEA: 0
PALPITATIONS: 0
FLANK PAIN: 0
NUMBNESS: 0

## 2024-01-18 NOTE — ASSESSMENT & PLAN NOTE
- start Miralax BID and Senna BID   - continue Fleet enema as needed only  - will order KUB and FOBT/FIT   - increase water intake with increase fiber intake

## 2024-01-18 NOTE — PROGRESS NOTES
Chief Complaint: Jay Rebolledo is a 73 y.o. male who presents for Constipation.    Patient presented to the outpatient GI clinic with a chief complaint of constipation that started a week ago.  He recently went to the emergency department for constipation requiring enema because docusate and MiraLAX did not help, and urinary retention requiring a urinary catheter placement.  No imaging such as X-ray or CT scans were done at that time.  After discharge home he continued to use Fleet enema for his constipation with good resolution.  He also complains of dry mouth and admits that he does not drink plenty of fluids nor takes supplemental fiber.  Patient also has a history of inguinal hernia with herniated descending colon requiring surgical intervention years ago.  Calcium levels within normal limits.  TSH within normal limits, repeat pending.     Review of Systems   Constitutional:  Negative for chills and fever.   Eyes:  Negative for visual disturbance.   Respiratory:  Negative for cough, shortness of breath and wheezing.    Cardiovascular:  Negative for chest pain, palpitations and leg swelling.   Gastrointestinal:  Positive for constipation. Negative for abdominal distention, abdominal pain, diarrhea, nausea and vomiting.   Genitourinary:  Positive for difficulty urinating. Negative for dysuria, flank pain, frequency and hematuria.   Musculoskeletal:  Negative for myalgias.   Skin:  Negative for color change.   Neurological:  Negative for dizziness, light-headedness and numbness.   Psychiatric/Behavioral:  Negative for confusion.    12 Point ROS negative outside of symptoms stated above in HPI    Past Medical History:   Diagnosis Date    Other conditions influencing health status 03/03/2022    History of cough       Past Surgical History:   Procedure Laterality Date    OTHER SURGICAL HISTORY  02/04/2019    Rotator cuff repair    OTHER SURGICAL HISTORY  02/04/2019    Percutaneous transluminal coronary angioplasty     OTHER SURGICAL HISTORY  02/04/2019    Coronary artery bypass graft    OTHER SURGICAL HISTORY  02/04/2019    Inguinal hernia repair       No relevant family history has been documented for this patient.     reports that he has never smoked. He has never used smokeless tobacco. He reports current alcohol use. He reports that he does not use drugs.    Allergies   Allergen Reactions    Penicillins Hives and Rash     and sycope as a child       Imaging  US pelvis limited    Result Date: 1/16/2024  Interpreted By:  Hank Downs, STUDY: US PELVIS LIMITED  1/16/2024 11:10 am   INDICATION: 72 y/o   M with  Signs/Symptoms:urine retention. BPH.   COMPARISON: None.   ACCESSION NUMBER(S): [id number]   ORDERING CLINICIAN: MIREYA YE   TECHNIQUE: Grayscale and color Doppler assessment of the prostate performed.   FINDINGS: The prostate gland measures 4.8 x 3.3 x 5.1 cm for a calculated volume of 42 mL. Barakat catheter in place. Mild heterogeneity of the prostate gland parenchyma. No focal mass is clearly delineated.       Calculated prostate volume of 42 mL.   MACRO: None   Signed by: Hank Downs 1/16/2024 4:04 PM Dictation workstation:   [id number]    US renal complete    Result Date: 1/16/2024  Interpreted By:  Hank Downs, STUDY: US RENAL COMPLETE;  1/16/2024 11:05 am   INDICATION: Signs/Symptoms:urine retention.   COMPARISON: CT abdomen pelvis dated 04/20/2018.   ACCESSION NUMBER(S): [id number]   ORDERING CLINICIAN: MIREYA YE   TECHNIQUE: Grayscale and color Doppler sonographic images of the kidneys were obtained  .   FINDINGS: RIGHT KIDNEY: The right kidney measures 10.4 cm in length. The renal cortical echogenicity and thickness are within normal limits. No hydronephrosis. No sonographic evidence of nephrolithiasis.   LEFT KIDNEY: The left kidney measures 10.8 cm in length. The renal cortical echogenicity and thickness are within normal limits. No hydronephrosis. No sonographic evidence of  "nephrolithiasis.   BLADDER: Barakat catheter with decompressed appearance of the bladder.       No hydronephrosis or hydroureter. Barakat catheter within a decompressed urinary bladder.   Signed by: Hank Downs 1/16/2024 4:02 PM Dictation workstation:   [id number]        Laboratory  Results for orders placed or performed during the hospital encounter of 01/15/24 (from the past 96 hour(s))   POCT GLUCOSE   Result Value Ref Range    POCT Glucose 335 (H) 74 - 99 mg/dL        Objective       Current Outpatient Medications:     aspirin 81 mg chewable tablet, Chew 1 tablet (81 mg) once daily., Disp: 30 tablet, Rfl: 11    cyanocobalamin (Vitamin B-12) 1,000 mcg tablet, Take 1 tablet (1,000 mcg) by mouth once daily., Disp: , Rfl:     empagliflozin (Jardiance) 10 mg, Take 1 tablet (10 mg) by mouth once daily., Disp: 30 tablet, Rfl: 5    gabapentin (Neurontin) 100 mg capsule, Take 1 capsule (100 mg) by mouth 3 times a day., Disp: 90 capsule, Rfl: 1    metFORMIN (Glucophage) 500 mg tablet, Take 1 tablet (500 mg) by mouth 3 times a day with meals., Disp: 90 tablet, Rfl: 11    omeprazole (PriLOSEC) 20 mg DR capsule, Take 1 capsule (20 mg) by mouth once daily. Do not crush or chew., Disp: 30 capsule, Rfl: 5    bisacodyl (Fleet Bisacodyl) 10 mg/30 mL enema, Insert 30 mL (10 mg) into the rectum 1 time for 1 dose., Disp: 30 mL, Rfl: 0    polyethylene glycol (Glycolax, Miralax) 17 gram packet, Take 17 g by mouth once daily for 3 days., Disp: 3 packet, Rfl: 0    sennosides-docusate sodium (Stefanie-Colace) 8.6-50 mg tablet, Take 1 tablet by mouth once daily., Disp: 30 tablet, Rfl: 11    tamsulosin (Flomax) 0.4 mg 24 hr capsule, Take 1 capsule (0.4 mg) by mouth once daily. (Patient not taking: Reported on 1/18/2024), Disp: 30 capsule, Rfl: 11    Last Recorded Vitals  Blood pressure 142/86, pulse 75, height 1.727 m (5' 8\"), weight 70.3 kg (155 lb).    Physical Exam  Constitutional:       General: He is not in acute distress.     Appearance: " Normal appearance.   HENT:      Head: Normocephalic and atraumatic.      Mouth/Throat:      Mouth: Mucous membranes are moist.   Eyes:      Conjunctiva/sclera: Conjunctivae normal.      Pupils: Pupils are equal, round, and reactive to light.   Cardiovascular:      Rate and Rhythm: Normal rate and regular rhythm.      Heart sounds: Normal heart sounds.   Pulmonary:      Effort: No respiratory distress.      Breath sounds: Normal breath sounds. No wheezing or rhonchi.   Abdominal:      General: Bowel sounds are normal.      Palpations: Abdomen is soft.      Tenderness: There is no abdominal tenderness.   Musculoskeletal:         General: No swelling.      Cervical back: Neck supple.   Skin:     General: Skin is warm and dry.   Neurological:      General: No focal deficit present.      Mental Status: He is alert.     Assessment/Plan     Problem List Items Addressed This Visit       Constipation     - start Miralax BID and Senna BID   - continue Fleet enema as needed only  - increase water intake with increase fiber intake  - no imaging at this time         Relevant Medications    polyethylene glycol (Glycolax, Miralax) 17 gram packet    sennosides-docusate sodium (Stefanie-Colace) 8.6-50 mg tablet    bisacodyl (Fleet Bisacodyl) 10 mg/30 mL enema    Other Relevant Orders    XR abdomen 1 view     Other Visit Diagnoses       Screen for colon cancer    -  Primary    Relevant Orders    Fecal Occult Blood Immunoassy          #GI Workup:  - KUB  - FIT test for colon cancer screening  I saw and evaluated the patient. I personally obtained the key and critical portions of the history and physical exam or was physically present for key and critical portions performed by the resident-Dr Aguirre. I reviewed the resident's documentation and discussed the patient with the resident. I agree with the resident's medical decision making as documented in the note.    Patient with history of BPH, diabetes came to GI clinic for further  evaluation.  He endorses constipation over the 6 months.  Visited ER due to urinary retention, worsening constipation.  He is on MiraLAX once daily Metamucil once daily.  Fleet bisacodyl enema every third day.  Pelvic sonogram shows calcified prostate-42 ml, scheduled to see urologist  Previous colonoscopy 6 months ago, no report available.  Denies rectal bleed, weight loss.  TSH within normal limit checked in the past    Constipation-new onset.    fecal immunochemical test  Abdominal x-ray to rule out  retained stool.  MiraLAX twice daily for now.  Metamucil 1-2 times daily.  Enema as needed.  Further management plan as needed.  Continue follow-up with PMD/urology  Follow-up with me in 6 to 8 weeks

## 2024-01-18 NOTE — PATIENT INSTRUCTIONS
Stool test --fecal immunochemical test  Abdominal x-ray to rule out  retained stool.  MiraLAX twice daily for now.  Metamucil 1-2 times daily.  Enema as needed.  Further management plan as needed.  Continue follow-up with PMD/urology  Follow-up with me in 6 to 8 weeks

## 2024-01-22 NOTE — TELEPHONE ENCOUNTER
Regarding: ER FOLLOW UP  ----- Message from Renetta Rai sent at 1/11/2024  9:58 AM EST -----  Patient scheduled

## 2024-01-26 ENCOUNTER — OFFICE VISIT (OUTPATIENT)
Dept: UROLOGY | Facility: CLINIC | Age: 74
End: 2024-01-26
Payer: MEDICARE

## 2024-01-26 DIAGNOSIS — R21 RASH: ICD-10-CM

## 2024-01-26 DIAGNOSIS — R33.9 URINE RETENTION: ICD-10-CM

## 2024-01-26 PROCEDURE — 51798 US URINE CAPACITY MEASURE: CPT | Performed by: STUDENT IN AN ORGANIZED HEALTH CARE EDUCATION/TRAINING PROGRAM

## 2024-01-26 PROCEDURE — 1036F TOBACCO NON-USER: CPT | Performed by: STUDENT IN AN ORGANIZED HEALTH CARE EDUCATION/TRAINING PROGRAM

## 2024-01-26 PROCEDURE — 99214 OFFICE O/P EST MOD 30 MIN: CPT | Performed by: STUDENT IN AN ORGANIZED HEALTH CARE EDUCATION/TRAINING PROGRAM

## 2024-01-26 PROCEDURE — 1159F MED LIST DOCD IN RCRD: CPT | Performed by: STUDENT IN AN ORGANIZED HEALTH CARE EDUCATION/TRAINING PROGRAM

## 2024-01-26 PROCEDURE — 1125F AMNT PAIN NOTED PAIN PRSNT: CPT | Performed by: STUDENT IN AN ORGANIZED HEALTH CARE EDUCATION/TRAINING PROGRAM

## 2024-01-26 PROCEDURE — 1160F RVW MEDS BY RX/DR IN RCRD: CPT | Performed by: STUDENT IN AN ORGANIZED HEALTH CARE EDUCATION/TRAINING PROGRAM

## 2024-01-26 RX ORDER — TAMSULOSIN HYDROCHLORIDE 0.4 MG/1
0.8 CAPSULE ORAL DAILY
Qty: 60 CAPSULE | Refills: 11 | Status: SHIPPED | OUTPATIENT
Start: 2024-01-26 | End: 2025-01-25

## 2024-01-26 NOTE — PROGRESS NOTES
Subjective   Patient ID: Jay Rebolledo is a 73 y.o. male.    HPI  Patient reports he is not doing well with the ongoing constipation.He states he only has difficulty urinating when he is constipated. He states he has been drinking lots of Uromax with no resolution of his symptoms. He also admits to being complaint with Tamsulosin.    He would like to know if he can self-perform an enema. His wife notes he only achieves a bowel movement with the enema. She notes the constipation started in 12/2023 and is associated with subjective weight loss.    Review of Systems  No other complaints    Objective   Physical Exam  Voiding trial performed: 300 ml NSS instilled in bladder, patient voided 300ml    Bladder scan:  mL    Results:  Prostate US done on 01/16/2024 revealed:  The prostate gland measures 4.8 x 3.3 x 5.1 cm for a calculated  volume of 42 mL. Barakat catheter in place. Mild heterogeneity of the  prostate gland parenchyma. No focal mass is clearly delineated.    Assessment/Plan   There are no diagnoses linked to this encounter.  - Increase Tamsulosin dose to 2 tabs daily  - Encourage more aggressive management of constipation  -Follow-up in 2 months    We had a long discussion about the increasing options for constipation including presenting to ED if he has increasing abdominal pain.  If constipation improves and he continues to have urinary retention, we will then discuss options for bladder outlet relief.    Scribe Attestation  By signing my name below, Idania REESE Scribe   attest that this documentation has been prepared under the direction and in the presence of Paul Beckman MD.

## 2024-01-27 DIAGNOSIS — K59.04 CHRONIC IDIOPATHIC CONSTIPATION: Primary | ICD-10-CM

## 2024-01-29 RX ORDER — CLOTRIMAZOLE 1 G/ML
SOLUTION TOPICAL 2 TIMES DAILY
Qty: 30 ML | Refills: 1 | Status: SHIPPED | OUTPATIENT
Start: 2024-01-29 | End: 2024-02-09 | Stop reason: HOSPADM

## 2024-01-30 ENCOUNTER — LAB (OUTPATIENT)
Dept: LAB | Facility: LAB | Age: 74
End: 2024-01-30
Payer: MEDICARE

## 2024-01-30 DIAGNOSIS — E55.9 VITAMIN D DEFICIENCY: ICD-10-CM

## 2024-01-30 DIAGNOSIS — E08.00 DIAB D/T UNDRL COND W HYPROSM W/O NONKET HYPRGLY-HYPROS COMA (MULTI): ICD-10-CM

## 2024-01-30 LAB
25(OH)D3 SERPL-MCNC: 30 NG/ML (ref 30–100)
ALBUMIN SERPL BCP-MCNC: 3.4 G/DL (ref 3.4–5)
ALP SERPL-CCNC: 108 U/L (ref 33–136)
ALT SERPL W P-5'-P-CCNC: 10 U/L (ref 10–52)
ANION GAP SERPL CALC-SCNC: 13 MMOL/L (ref 10–20)
AST SERPL W P-5'-P-CCNC: 12 U/L (ref 9–39)
BILIRUB SERPL-MCNC: 0.3 MG/DL (ref 0–1.2)
BUN SERPL-MCNC: 25 MG/DL (ref 6–23)
CALCIUM SERPL-MCNC: 8.7 MG/DL (ref 8.6–10.3)
CHLORIDE SERPL-SCNC: 99 MMOL/L (ref 98–107)
CHOLEST SERPL-MCNC: 238 MG/DL (ref 0–199)
CHOLESTEROL/HDL RATIO: 4.8
CO2 SERPL-SCNC: 29 MMOL/L (ref 21–32)
CREAT SERPL-MCNC: 0.99 MG/DL (ref 0.5–1.3)
EGFRCR SERPLBLD CKD-EPI 2021: 81 ML/MIN/1.73M*2
EST. AVERAGE GLUCOSE BLD GHB EST-MCNC: 321 MG/DL
GLUCOSE SERPL-MCNC: 376 MG/DL (ref 74–99)
HBA1C MFR BLD: 12.8 %
HDLC SERPL-MCNC: 49.4 MG/DL
LDLC SERPL CALC-MCNC: 139 MG/DL
NON HDL CHOLESTEROL: 189 MG/DL (ref 0–149)
POTASSIUM SERPL-SCNC: 4.6 MMOL/L (ref 3.5–5.3)
PROT SERPL-MCNC: 6.3 G/DL (ref 6.4–8.2)
SODIUM SERPL-SCNC: 136 MMOL/L (ref 136–145)
T4 FREE SERPL-MCNC: 0.81 NG/DL (ref 0.61–1.12)
TRIGL SERPL-MCNC: 249 MG/DL (ref 0–149)
TSH SERPL-ACNC: 6.16 MIU/L (ref 0.44–3.98)
VLDL: 50 MG/DL (ref 0–40)

## 2024-01-30 PROCEDURE — 80061 LIPID PANEL: CPT

## 2024-01-30 PROCEDURE — 84443 ASSAY THYROID STIM HORMONE: CPT

## 2024-01-30 PROCEDURE — 82306 VITAMIN D 25 HYDROXY: CPT

## 2024-01-30 PROCEDURE — 80053 COMPREHEN METABOLIC PANEL: CPT

## 2024-01-30 PROCEDURE — 83036 HEMOGLOBIN GLYCOSYLATED A1C: CPT

## 2024-01-30 PROCEDURE — 84439 ASSAY OF FREE THYROXINE: CPT

## 2024-01-30 PROCEDURE — 36415 COLL VENOUS BLD VENIPUNCTURE: CPT

## 2024-01-31 DIAGNOSIS — K59.04 CHRONIC IDIOPATHIC CONSTIPATION: Primary | ICD-10-CM

## 2024-01-31 RX ORDER — PLECANATIDE 3 MG/1
3 TABLET ORAL DAILY
Qty: 30 TABLET | Refills: 2 | Status: SHIPPED | OUTPATIENT
Start: 2024-01-31 | End: 2024-02-09 | Stop reason: HOSPADM

## 2024-02-04 DIAGNOSIS — K59.04 CHRONIC IDIOPATHIC CONSTIPATION: Primary | ICD-10-CM

## 2024-02-04 RX ORDER — LUBIPROSTONE 24 UG/1
24 CAPSULE ORAL
Qty: 60 CAPSULE | Refills: 2 | Status: SHIPPED | OUTPATIENT
Start: 2024-02-04 | End: 2024-03-11 | Stop reason: ALTCHOICE

## 2024-02-07 ENCOUNTER — OFFICE VISIT (OUTPATIENT)
Dept: PRIMARY CARE | Facility: CLINIC | Age: 74
End: 2024-02-07
Payer: MEDICARE

## 2024-02-07 ENCOUNTER — APPOINTMENT (OUTPATIENT)
Dept: CARDIOLOGY | Facility: HOSPITAL | Age: 74
DRG: 309 | End: 2024-02-07
Payer: MEDICARE

## 2024-02-07 ENCOUNTER — APPOINTMENT (OUTPATIENT)
Dept: RADIOLOGY | Facility: HOSPITAL | Age: 74
DRG: 309 | End: 2024-02-07
Payer: MEDICARE

## 2024-02-07 ENCOUNTER — HOSPITAL ENCOUNTER (INPATIENT)
Facility: HOSPITAL | Age: 74
LOS: 2 days | Discharge: HOME | DRG: 309 | End: 2024-02-09
Attending: STUDENT IN AN ORGANIZED HEALTH CARE EDUCATION/TRAINING PROGRAM | Admitting: INTERNAL MEDICINE
Payer: MEDICARE

## 2024-02-07 VITALS
BODY MASS INDEX: 24.55 KG/M2 | HEART RATE: 89 BPM | HEIGHT: 68 IN | SYSTOLIC BLOOD PRESSURE: 119 MMHG | DIASTOLIC BLOOD PRESSURE: 72 MMHG | WEIGHT: 162 LBS | OXYGEN SATURATION: 97 %

## 2024-02-07 DIAGNOSIS — I20.81 ANGINA PECTORIS WITH CORONARY MICROVASCULAR DYSFUNCTION (CMS-HCC): ICD-10-CM

## 2024-02-07 DIAGNOSIS — Z79.4 TYPE 2 DIABETES MELLITUS WITH OTHER SPECIFIED COMPLICATION, WITH LONG-TERM CURRENT USE OF INSULIN (MULTI): ICD-10-CM

## 2024-02-07 DIAGNOSIS — I48.92 ATRIAL FLUTTER (MULTI): Primary | ICD-10-CM

## 2024-02-07 DIAGNOSIS — Z95.1 S/P CABG X 4: ICD-10-CM

## 2024-02-07 DIAGNOSIS — E08.00 DIAB D/T UNDRL COND W HYPROSM W/O NONKET HYPRGLY-HYPROS COMA (MULTI): ICD-10-CM

## 2024-02-07 DIAGNOSIS — E11.69 TYPE 2 DIABETES MELLITUS WITH OTHER SPECIFIED COMPLICATION, WITH LONG-TERM CURRENT USE OF INSULIN (MULTI): ICD-10-CM

## 2024-02-07 DIAGNOSIS — I49.9 IRREGULAR HEARTBEAT: ICD-10-CM

## 2024-02-07 DIAGNOSIS — I48.92 ATRIAL FLUTTER BY ELECTROCARDIOGRAM (MULTI): ICD-10-CM

## 2024-02-07 DIAGNOSIS — I73.9 PERIPHERAL VASCULAR DISEASE, UNSPECIFIED (CMS-HCC): ICD-10-CM

## 2024-02-07 DIAGNOSIS — K59.04 CHRONIC IDIOPATHIC CONSTIPATION: ICD-10-CM

## 2024-02-07 DIAGNOSIS — R53.1 GENERALIZED WEAKNESS: ICD-10-CM

## 2024-02-07 DIAGNOSIS — C43.61 MALIGNANT MELANOMA OF RIGHT UPPER EXTREMITY INCLUDING SHOULDER (MULTI): ICD-10-CM

## 2024-02-07 DIAGNOSIS — R53.83 OTHER FATIGUE: ICD-10-CM

## 2024-02-07 DIAGNOSIS — N30.00 ACUTE CYSTITIS WITHOUT HEMATURIA: ICD-10-CM

## 2024-02-07 DIAGNOSIS — I25.810 CORONARY ARTERY DISEASE INVOLVING CORONARY BYPASS GRAFT OF NATIVE HEART, UNSPECIFIED WHETHER ANGINA PRESENT: ICD-10-CM

## 2024-02-07 DIAGNOSIS — G82.20 PARAPARESIS (MULTI): ICD-10-CM

## 2024-02-07 DIAGNOSIS — R07.9 CHEST PAIN, UNSPECIFIED TYPE: ICD-10-CM

## 2024-02-07 DIAGNOSIS — K59.09 OTHER CONSTIPATION: Primary | ICD-10-CM

## 2024-02-07 LAB
ALBUMIN SERPL BCP-MCNC: 3.7 G/DL (ref 3.4–5)
ALP SERPL-CCNC: 100 U/L (ref 33–136)
ALT SERPL W P-5'-P-CCNC: 14 U/L (ref 10–52)
ANION GAP SERPL CALC-SCNC: 15 MMOL/L (ref 10–20)
APTT PPP: 35 SECONDS (ref 27–38)
AST SERPL W P-5'-P-CCNC: 14 U/L (ref 9–39)
BASE EXCESS BLDV CALC-SCNC: 3 MMOL/L (ref -2–3)
BASOPHILS # BLD AUTO: 0.04 X10*3/UL (ref 0–0.1)
BASOPHILS NFR BLD AUTO: 0.5 %
BILIRUB SERPL-MCNC: 0.4 MG/DL (ref 0–1.2)
BODY TEMPERATURE: ABNORMAL
BUN SERPL-MCNC: 20 MG/DL (ref 6–23)
CALCIUM SERPL-MCNC: 9.1 MG/DL (ref 8.6–10.3)
CARDIAC TROPONIN I PNL SERPL HS: 6 NG/L (ref 0–20)
CHLORIDE SERPL-SCNC: 103 MMOL/L (ref 98–107)
CO2 SERPL-SCNC: 25 MMOL/L (ref 21–32)
CREAT SERPL-MCNC: 0.9 MG/DL (ref 0.5–1.3)
EGFRCR SERPLBLD CKD-EPI 2021: >90 ML/MIN/1.73M*2
EOSINOPHIL # BLD AUTO: 0.31 X10*3/UL (ref 0–0.4)
EOSINOPHIL NFR BLD AUTO: 3.9 %
ERYTHROCYTE [DISTWIDTH] IN BLOOD BY AUTOMATED COUNT: 13.6 % (ref 11.5–14.5)
GLUCOSE BLD MANUAL STRIP-MCNC: 229 MG/DL (ref 74–99)
GLUCOSE SERPL-MCNC: 78 MG/DL (ref 74–99)
HCO3 BLDV-SCNC: 29 MMOL/L (ref 22–26)
HCT VFR BLD AUTO: 43.1 % (ref 41–52)
HGB BLD-MCNC: 14 G/DL (ref 13.5–17.5)
IMM GRANULOCYTES # BLD AUTO: 0.03 X10*3/UL (ref 0–0.5)
IMM GRANULOCYTES NFR BLD AUTO: 0.4 % (ref 0–0.9)
INHALED O2 CONCENTRATION: 21 %
INR PPP: 1 (ref 0.9–1.1)
LYMPHOCYTES # BLD AUTO: 1.69 X10*3/UL (ref 0.8–3)
LYMPHOCYTES NFR BLD AUTO: 21.5 %
MAGNESIUM SERPL-MCNC: 2.09 MG/DL (ref 1.6–2.4)
MCH RBC QN AUTO: 28.6 PG (ref 26–34)
MCHC RBC AUTO-ENTMCNC: 32.5 G/DL (ref 32–36)
MCV RBC AUTO: 88 FL (ref 80–100)
MONOCYTES # BLD AUTO: 0.55 X10*3/UL (ref 0.05–0.8)
MONOCYTES NFR BLD AUTO: 7 %
NEUTROPHILS # BLD AUTO: 5.23 X10*3/UL (ref 1.6–5.5)
NEUTROPHILS NFR BLD AUTO: 66.7 %
NRBC BLD-RTO: 0 /100 WBCS (ref 0–0)
OXYHGB MFR BLDV: 66.8 % (ref 45–75)
PCO2 BLDV: 49 MM HG (ref 41–51)
PH BLDV: 7.38 PH (ref 7.33–7.43)
PLATELET # BLD AUTO: 272 X10*3/UL (ref 150–450)
PO2 BLDV: 39 MM HG (ref 35–45)
POTASSIUM SERPL-SCNC: 3.6 MMOL/L (ref 3.5–5.3)
PROT SERPL-MCNC: 7.2 G/DL (ref 6.4–8.2)
PROTHROMBIN TIME: 11.5 SECONDS (ref 9.8–12.8)
RBC # BLD AUTO: 4.89 X10*6/UL (ref 4.5–5.9)
SAO2 % BLDV: 68 % (ref 45–75)
SODIUM SERPL-SCNC: 139 MMOL/L (ref 136–145)
T4 FREE SERPL-MCNC: 0.72 NG/DL (ref 0.61–1.12)
TSH SERPL-ACNC: 5.73 MIU/L (ref 0.44–3.98)
WBC # BLD AUTO: 7.9 X10*3/UL (ref 4.4–11.3)

## 2024-02-07 PROCEDURE — 99215 OFFICE O/P EST HI 40 MIN: CPT | Performed by: NURSE PRACTITIONER

## 2024-02-07 PROCEDURE — 71045 X-RAY EXAM CHEST 1 VIEW: CPT

## 2024-02-07 PROCEDURE — 1036F TOBACCO NON-USER: CPT | Performed by: NURSE PRACTITIONER

## 2024-02-07 PROCEDURE — 2500000004 HC RX 250 GENERAL PHARMACY W/ HCPCS (ALT 636 FOR OP/ED): Performed by: INTERNAL MEDICINE

## 2024-02-07 PROCEDURE — 99223 1ST HOSP IP/OBS HIGH 75: CPT | Performed by: INTERNAL MEDICINE

## 2024-02-07 PROCEDURE — 3050F LDL-C >= 130 MG/DL: CPT | Performed by: NURSE PRACTITIONER

## 2024-02-07 PROCEDURE — 2500000004 HC RX 250 GENERAL PHARMACY W/ HCPCS (ALT 636 FOR OP/ED): Performed by: NURSE PRACTITIONER

## 2024-02-07 PROCEDURE — 93000 ELECTROCARDIOGRAM COMPLETE: CPT | Performed by: NURSE PRACTITIONER

## 2024-02-07 PROCEDURE — 3074F SYST BP LT 130 MM HG: CPT | Performed by: NURSE PRACTITIONER

## 2024-02-07 PROCEDURE — 71045 X-RAY EXAM CHEST 1 VIEW: CPT | Performed by: RADIOLOGY

## 2024-02-07 PROCEDURE — 1159F MED LIST DOCD IN RCRD: CPT | Performed by: NURSE PRACTITIONER

## 2024-02-07 PROCEDURE — 3046F HEMOGLOBIN A1C LEVEL >9.0%: CPT | Performed by: NURSE PRACTITIONER

## 2024-02-07 PROCEDURE — 84443 ASSAY THYROID STIM HORMONE: CPT | Performed by: NURSE PRACTITIONER

## 2024-02-07 PROCEDURE — 1160F RVW MEDS BY RX/DR IN RCRD: CPT | Performed by: NURSE PRACTITIONER

## 2024-02-07 PROCEDURE — 93005 ELECTROCARDIOGRAM TRACING: CPT

## 2024-02-07 PROCEDURE — 1125F AMNT PAIN NOTED PAIN PRSNT: CPT | Performed by: NURSE PRACTITIONER

## 2024-02-07 PROCEDURE — 99285 EMERGENCY DEPT VISIT HI MDM: CPT | Mod: 25 | Performed by: STUDENT IN AN ORGANIZED HEALTH CARE EDUCATION/TRAINING PROGRAM

## 2024-02-07 PROCEDURE — 84484 ASSAY OF TROPONIN QUANT: CPT | Performed by: NURSE PRACTITIONER

## 2024-02-07 PROCEDURE — 82947 ASSAY GLUCOSE BLOOD QUANT: CPT

## 2024-02-07 PROCEDURE — 83735 ASSAY OF MAGNESIUM: CPT | Performed by: NURSE PRACTITIONER

## 2024-02-07 PROCEDURE — 2500000001 HC RX 250 WO HCPCS SELF ADMINISTERED DRUGS (ALT 637 FOR MEDICARE OP): Performed by: INTERNAL MEDICINE

## 2024-02-07 PROCEDURE — 83880 ASSAY OF NATRIURETIC PEPTIDE: CPT | Performed by: NURSE PRACTITIONER

## 2024-02-07 PROCEDURE — 3078F DIAST BP <80 MM HG: CPT | Performed by: NURSE PRACTITIONER

## 2024-02-07 PROCEDURE — 85025 COMPLETE CBC W/AUTO DIFF WBC: CPT | Performed by: NURSE PRACTITIONER

## 2024-02-07 PROCEDURE — 1200000002 HC GENERAL ROOM WITH TELEMETRY DAILY

## 2024-02-07 PROCEDURE — 84439 ASSAY OF FREE THYROXINE: CPT | Performed by: NURSE PRACTITIONER

## 2024-02-07 PROCEDURE — 85610 PROTHROMBIN TIME: CPT | Performed by: NURSE PRACTITIONER

## 2024-02-07 PROCEDURE — 84075 ASSAY ALKALINE PHOSPHATASE: CPT | Performed by: NURSE PRACTITIONER

## 2024-02-07 PROCEDURE — 36415 COLL VENOUS BLD VENIPUNCTURE: CPT | Performed by: NURSE PRACTITIONER

## 2024-02-07 PROCEDURE — 82805 BLOOD GASES W/O2 SATURATION: CPT | Performed by: NURSE PRACTITIONER

## 2024-02-07 RX ORDER — METFORMIN HYDROCHLORIDE 500 MG/1
1000 TABLET ORAL
Qty: 180 TABLET | Refills: 1 | Status: SHIPPED | OUTPATIENT
Start: 2024-02-07 | End: 2024-02-09 | Stop reason: HOSPADM

## 2024-02-07 RX ORDER — NITROGLYCERIN 0.4 MG/1
0.4 TABLET SUBLINGUAL EVERY 5 MIN PRN
Status: DISCONTINUED | OUTPATIENT
Start: 2024-02-07 | End: 2024-02-09 | Stop reason: HOSPADM

## 2024-02-07 RX ORDER — GABAPENTIN 100 MG/1
100 CAPSULE ORAL 3 TIMES DAILY
Status: DISCONTINUED | OUTPATIENT
Start: 2024-02-07 | End: 2024-02-09 | Stop reason: HOSPADM

## 2024-02-07 RX ORDER — AMOXICILLIN 250 MG
1 CAPSULE ORAL DAILY
Status: DISCONTINUED | OUTPATIENT
Start: 2024-02-07 | End: 2024-02-09 | Stop reason: HOSPADM

## 2024-02-07 RX ORDER — DOCUSATE SODIUM 100 MG/1
100 CAPSULE, LIQUID FILLED ORAL 3 TIMES DAILY PRN
Qty: 30 CAPSULE | Refills: 1 | Status: SHIPPED | OUTPATIENT
Start: 2024-02-07

## 2024-02-07 RX ORDER — INSULIN LISPRO 100 [IU]/ML
0-20 INJECTION, SOLUTION INTRAVENOUS; SUBCUTANEOUS
Status: DISCONTINUED | OUTPATIENT
Start: 2024-02-08 | End: 2024-02-09 | Stop reason: HOSPADM

## 2024-02-07 RX ORDER — DOCUSATE SODIUM 100 MG/1
100 CAPSULE, LIQUID FILLED ORAL 2 TIMES DAILY
Status: DISCONTINUED | OUTPATIENT
Start: 2024-02-07 | End: 2024-02-09 | Stop reason: HOSPADM

## 2024-02-07 RX ORDER — NAPROXEN SODIUM 220 MG/1
81 TABLET, FILM COATED ORAL DAILY
Status: DISCONTINUED | OUTPATIENT
Start: 2024-02-07 | End: 2024-02-09 | Stop reason: HOSPADM

## 2024-02-07 RX ORDER — DEXTROSE 50 % IN WATER (D50W) INTRAVENOUS SYRINGE
25
Status: DISCONTINUED | OUTPATIENT
Start: 2024-02-07 | End: 2024-02-08

## 2024-02-07 RX ORDER — PANTOPRAZOLE SODIUM 40 MG/1
40 TABLET, DELAYED RELEASE ORAL
Status: DISCONTINUED | OUTPATIENT
Start: 2024-02-08 | End: 2024-02-09 | Stop reason: HOSPADM

## 2024-02-07 RX ORDER — INSULIN LISPRO 100 [IU]/ML
0-20 INJECTION, SOLUTION INTRAVENOUS; SUBCUTANEOUS
Status: DISCONTINUED | OUTPATIENT
Start: 2024-02-08 | End: 2024-02-07

## 2024-02-07 RX ORDER — POLYETHYLENE GLYCOL 3350 17 G/17G
17 POWDER, FOR SOLUTION ORAL DAILY
Status: DISCONTINUED | OUTPATIENT
Start: 2024-02-07 | End: 2024-02-09 | Stop reason: HOSPADM

## 2024-02-07 RX ORDER — ATORVASTATIN CALCIUM 80 MG/1
80 TABLET, FILM COATED ORAL NIGHTLY
Status: DISCONTINUED | OUTPATIENT
Start: 2024-02-07 | End: 2024-02-09 | Stop reason: HOSPADM

## 2024-02-07 RX ORDER — HEPARIN SODIUM 5000 [USP'U]/ML
80 INJECTION, SOLUTION INTRAVENOUS; SUBCUTANEOUS ONCE
Status: COMPLETED | OUTPATIENT
Start: 2024-02-07 | End: 2024-02-07

## 2024-02-07 RX ORDER — DEXTROSE MONOHYDRATE 100 MG/ML
0.3 INJECTION, SOLUTION INTRAVENOUS ONCE AS NEEDED
Status: DISCONTINUED | OUTPATIENT
Start: 2024-02-07 | End: 2024-02-09 | Stop reason: HOSPADM

## 2024-02-07 RX ORDER — HEPARIN SODIUM 10000 [USP'U]/100ML
0-4500 INJECTION, SOLUTION INTRAVENOUS CONTINUOUS
Status: DISCONTINUED | OUTPATIENT
Start: 2024-02-07 | End: 2024-02-08

## 2024-02-07 RX ORDER — ACETAMINOPHEN 500 MG
5 TABLET ORAL NIGHTLY PRN
Status: DISCONTINUED | OUTPATIENT
Start: 2024-02-07 | End: 2024-02-09 | Stop reason: HOSPADM

## 2024-02-07 RX ORDER — TAMSULOSIN HYDROCHLORIDE 0.4 MG/1
0.8 CAPSULE ORAL DAILY
Status: DISCONTINUED | OUTPATIENT
Start: 2024-02-08 | End: 2024-02-09 | Stop reason: HOSPADM

## 2024-02-07 RX ORDER — METFORMIN HYDROCHLORIDE 500 MG/1
1000 TABLET ORAL
Status: DISCONTINUED | OUTPATIENT
Start: 2024-02-08 | End: 2024-02-09 | Stop reason: HOSPADM

## 2024-02-07 RX ORDER — ACETAMINOPHEN 325 MG/1
650 TABLET ORAL EVERY 6 HOURS PRN
Status: DISCONTINUED | OUTPATIENT
Start: 2024-02-07 | End: 2024-02-09 | Stop reason: HOSPADM

## 2024-02-07 RX ADMIN — SENNOSIDES AND DOCUSATE SODIUM 1 TABLET: 50; 8.6 TABLET ORAL at 22:29

## 2024-02-07 RX ADMIN — INSULIN LISPRO 8 UNITS: 100 INJECTION, SOLUTION INTRAVENOUS; SUBCUTANEOUS at 23:00

## 2024-02-07 RX ADMIN — HEPARIN SODIUM 1300 UNITS/HR: 10000 INJECTION, SOLUTION INTRAVENOUS at 22:31

## 2024-02-07 RX ADMIN — GABAPENTIN 100 MG: 100 CAPSULE ORAL at 22:29

## 2024-02-07 RX ADMIN — SODIUM CHLORIDE 1000 ML: 9 INJECTION, SOLUTION INTRAVENOUS at 16:15

## 2024-02-07 RX ADMIN — ATORVASTATIN CALCIUM 80 MG: 80 TABLET, FILM COATED ORAL at 22:28

## 2024-02-07 RX ADMIN — HEPARIN SODIUM 5750 UNITS: 5000 INJECTION, SOLUTION INTRAVENOUS; SUBCUTANEOUS at 19:30

## 2024-02-07 RX ADMIN — POLYETHYLENE GLYCOL 3350 17 G: 17 POWDER, FOR SOLUTION ORAL at 22:28

## 2024-02-07 SDOH — SOCIAL STABILITY: SOCIAL INSECURITY: DOES ANYONE TRY TO KEEP YOU FROM HAVING/CONTACTING OTHER FRIENDS OR DOING THINGS OUTSIDE YOUR HOME?: NO

## 2024-02-07 SDOH — SOCIAL STABILITY: SOCIAL INSECURITY: ARE YOU OR HAVE YOU BEEN THREATENED OR ABUSED PHYSICALLY, EMOTIONALLY, OR SEXUALLY BY ANYONE?: NO

## 2024-02-07 SDOH — SOCIAL STABILITY: SOCIAL INSECURITY: ABUSE: ADULT

## 2024-02-07 SDOH — SOCIAL STABILITY: SOCIAL INSECURITY: WERE YOU ABLE TO COMPLETE ALL THE BEHAVIORAL HEALTH SCREENINGS?: YES

## 2024-02-07 SDOH — SOCIAL STABILITY: SOCIAL INSECURITY: ARE THERE ANY APPARENT SIGNS OF INJURIES/BEHAVIORS THAT COULD BE RELATED TO ABUSE/NEGLECT?: NO

## 2024-02-07 SDOH — SOCIAL STABILITY: SOCIAL INSECURITY: DO YOU FEEL ANYONE HAS EXPLOITED OR TAKEN ADVANTAGE OF YOU FINANCIALLY OR OF YOUR PERSONAL PROPERTY?: NO

## 2024-02-07 SDOH — SOCIAL STABILITY: SOCIAL INSECURITY: DO YOU FEEL UNSAFE GOING BACK TO THE PLACE WHERE YOU ARE LIVING?: NO

## 2024-02-07 SDOH — SOCIAL STABILITY: SOCIAL INSECURITY: HAS ANYONE EVER THREATENED TO HURT YOUR FAMILY OR YOUR PETS?: NO

## 2024-02-07 SDOH — SOCIAL STABILITY: SOCIAL INSECURITY: HAVE YOU HAD THOUGHTS OF HARMING ANYONE ELSE?: NO

## 2024-02-07 ASSESSMENT — ENCOUNTER SYMPTOMS
CHILLS: 0
PSYCHIATRIC NEGATIVE: 1
FEVER: 0
WEAKNESS: 0
FREQUENCY: 1
NUMBNESS: 0
MUSCULOSKELETAL NEGATIVE: 1
HEADACHES: 0
FATIGUE: 1
PALPITATIONS: 0
SHORTNESS OF BREATH: 1
VOMITING: 0
DIZZINESS: 0
SORE THROAT: 0
ABDOMINAL PAIN: 0
CONSTIPATION: 1
CHEST TIGHTNESS: 1
DIARRHEA: 0
COUGH: 0
NAUSEA: 0

## 2024-02-07 ASSESSMENT — PATIENT HEALTH QUESTIONNAIRE - PHQ9
2. FEELING DOWN, DEPRESSED OR HOPELESS: SEVERAL DAYS
1. LITTLE INTEREST OR PLEASURE IN DOING THINGS: NOT AT ALL
SUM OF ALL RESPONSES TO PHQ9 QUESTIONS 1 AND 2: 0
SUM OF ALL RESPONSES TO PHQ9 QUESTIONS 1 & 2: 1
1. LITTLE INTEREST OR PLEASURE IN DOING THINGS: NOT AT ALL
2. FEELING DOWN, DEPRESSED OR HOPELESS: NOT AT ALL

## 2024-02-07 ASSESSMENT — COGNITIVE AND FUNCTIONAL STATUS - GENERAL
DAILY ACTIVITIY SCORE: 24
DAILY ACTIVITIY SCORE: 24
MOBILITY SCORE: 22
PATIENT BASELINE BEDBOUND: NO
WALKING IN HOSPITAL ROOM: A LITTLE
CLIMB 3 TO 5 STEPS WITH RAILING: A LITTLE
MOBILITY SCORE: 24

## 2024-02-07 ASSESSMENT — LIFESTYLE VARIABLES
HOW OFTEN DO YOU HAVE A DRINK CONTAINING ALCOHOL: MONTHLY OR LESS
AUDIT-C TOTAL SCORE: 1
AUDIT-C TOTAL SCORE: 1
HAVE PEOPLE ANNOYED YOU BY CRITICIZING YOUR DRINKING: NO
HOW OFTEN DO YOU HAVE 6 OR MORE DRINKS ON ONE OCCASION: NEVER
HAVE YOU EVER FELT YOU SHOULD CUT DOWN ON YOUR DRINKING: NO
EVER HAD A DRINK FIRST THING IN THE MORNING TO STEADY YOUR NERVES TO GET RID OF A HANGOVER: NO
SKIP TO QUESTIONS 9-10: 1
EVER FELT BAD OR GUILTY ABOUT YOUR DRINKING: NO
HOW MANY STANDARD DRINKS CONTAINING ALCOHOL DO YOU HAVE ON A TYPICAL DAY: 1 OR 2

## 2024-02-07 ASSESSMENT — ACTIVITIES OF DAILY LIVING (ADL)
PATIENT'S MEMORY ADEQUATE TO SAFELY COMPLETE DAILY ACTIVITIES?: YES
HEARING - RIGHT EAR: FUNCTIONAL
TOILETING: INDEPENDENT
HEARING - LEFT EAR: FUNCTIONAL
LACK_OF_TRANSPORTATION: NO
ASSISTIVE_DEVICE: EYEGLASSES;DENTURES LOWER;DENTURES UPPER
BATHING: INDEPENDENT
ADEQUATE_TO_COMPLETE_ADL: YES
DRESSING YOURSELF: INDEPENDENT
FEEDING YOURSELF: INDEPENDENT
GROOMING: INDEPENDENT
JUDGMENT_ADEQUATE_SAFELY_COMPLETE_DAILY_ACTIVITIES: YES
WALKS IN HOME: NEEDS ASSISTANCE

## 2024-02-07 ASSESSMENT — PAIN - FUNCTIONAL ASSESSMENT: PAIN_FUNCTIONAL_ASSESSMENT: 0-10

## 2024-02-07 ASSESSMENT — PAIN SCALES - GENERAL: PAINLEVEL_OUTOF10: 0 - NO PAIN

## 2024-02-07 NOTE — PROGRESS NOTES
Pharmacy Medication History Review    Jay Rebolledo is a 73 y.o. male admitted for Atrial flutter (CMS/[facility]). Pharmacy reviewed the patient's [id number] medications and allergies for accuracy.    The list below reflectives the updated PTA list. Please review each medication in order reconciliation for additional clarification and justification.  Prior to Admission medications    Medication Sig Start Date End Date Taking? Authorizing Provider   aspirin 81 mg chewable tablet Chew 1 tablet (81 mg) once daily. 1/10/24 1/9/25  SAURAV Pagan   clotrimazole (Lotrimin) 1 % external solution Apply topically 2 times a day. 1/29/24   Paul Beckman MD   docusate sodium (Dulcolax Stool Softener, dss,) 100 mg capsule Take 1 capsule (100 mg) by mouth 3 times a day as needed for constipation. 2/7/24   SAURAV Pagan   empagliflozin (Jardiance) 25 mg Take 1 tablet (25 mg) by mouth once daily.  Patient taking differently: Take 1 tablet (25 mg) by mouth once daily in the morning. 2/7/24   SAURAV Pagan   gabapentin (Neurontin) 100 mg capsule Take 1 capsule (100 mg) by mouth 3 times a day.  Patient taking differently: Take 1 capsule (100 mg) by mouth 3 times a day. Last OAR[place] fill: 1/10/24 #90 for 30 days 1/10/24   SAURAV Pagan   linaCLOtide (Linzess) 72 mcg capsule Take 1 capsule (72 mcg) by mouth once daily in the morning. Take before meals. Do not crush or chew.  Patient not taking: Reported on 2/7/2024 1/27/24 1/26/25  Jovanny Suggs MD   lubiprostone (Amitiza) 24 mcg capsule Take 1 capsule (24 mcg) by mouth 2 times a day with meals. Take with meals 2/4/24 2/3/25  Jovanny Suggs MD   metFORMIN (Glucophage) 500 mg tablet Take 2 tablets (1,000 mg) by mouth 2 times a day with meals.  Patient taking differently: Take 2 tablets (1,000 mg) by mouth 2 times a day. 2/7/24   Edwige C Bruening, APRN-CNP   omeprazole (PriLOSEC) 20 mg DR capsule Take 1 capsule (20  mg) by mouth once daily. Do not crush or chew. 1/10/24 7/8/24  SAURAV Pagan   plecanatide (Trulance) tablet tablet Take 1 tablet (3 mg) by mouth once daily.  Patient taking differently: Take 1 tablet (3 mg) by mouth once daily in the morning. 1/31/24 1/30/25  Jovanny Suggs MD   sennosides-docusate sodium (Stefanie-Colace) 8.6-50 mg tablet Take 1 tablet by mouth once daily. 1/18/24 1/17/25  Maximino Aguirre DO   tamsulosin (Flomax) 0.4 mg 24 hr capsule Take 2 capsules (0.8 mg) by mouth once daily.  Patient taking differently: Take 2 capsules (0.8 mg) by mouth once daily in the morning. 1/26/24 1/25/25  Paul Beckman MD   cyanocobalamin (Vitamin B-12) 1,000 mcg tablet Take 1 tablet (1,000 mcg) by mouth once daily.  2/7/24  Historical Provider, MD   empagliflozin (Jardiance) 10 mg Take 1 tablet (10 mg) by mouth once daily. 1/10/24 2/7/24  SAURAV Pagan   metFORMIN (Glucophage) 500 mg tablet Take 1 tablet (500 mg) by mouth 3 times a day with meals. 1/10/24 2/7/24  SAURAV Pagan        The list below reflectives the updated allergy list. Please review each documented allergy for additional clarification and justification.  Allergies  Reviewed by Chalino Holden RN on 2/7/2024        Severity Reactions Comments    Penicillins Low Hives, Rash and sycope as a child            Below are additional concerns with the patient's PTA list.      Ashlee Diop CPhT

## 2024-02-07 NOTE — ED TRIAGE NOTES
Patient sent in by PCP for new onset A flutter. Patient states he feels weak but has no other complaints.

## 2024-02-07 NOTE — ASSESSMENT & PLAN NOTE
Atrial flutter with PVCs detected on ECG.  Confirmed by cardiology at [facility].  Started Eliquis 5 mg twice daily had first dose here in the office today at 3:00.  Sending patient to ED due to symptoms of chest pain, shortness of breath, and extreme fatigue, discouraged him from going home first.

## 2024-02-07 NOTE — ASSESSMENT & PLAN NOTE
Sending to emergency department for reports of chest pain at least once a week.  History of CABG x 4

## 2024-02-07 NOTE — ASSESSMENT & PLAN NOTE
Increased Jardiance to 25 daily  Increase metformin to 1000 mg twice daily  Will look at restarting insulin for uncontrolled A1c  Likely will start insulin while you are in the hospital    Copied: Regarding elevated A1C, Extremely noncompliant with diet, discussed meal prepping. has been to DM education , states he just doesn't listen

## 2024-02-07 NOTE — ED PROVIDER NOTES
HPI   Chief Complaint   Patient presents with    Abnormal ECG       73-year-old male was sent over by Dr. Garcia's office with concern for new onset a flutter/atrial fibs.  She indicates that Dr. Pathak already looked at the EKG.  She already started the patient on Eliquis.  Patient typically does not like to go the doctor's office.  She reports that the hemoglobin A1c on last check was 15 and his diabetes is uncontrolled.  Patient endorses weakness over the last 3 months.  He he presently denies pain.  He endorses becoming short of breath when he tries to ambulate up steps.  The EKG that we completed in our emergency department is just a flutter. He is denying melena or hematochezia.  He is denying any recent trauma or fall.  He is denying headache.  He he is denying becoming diaphoretic.  He is denying nausea or vomiting but he has chronic lower extremity pain from neuropathy from his uncontrolled diabetes.      History provided by:  Patient   used: No                        Paras Coma Scale Score: 15                     Patient History   Past Medical History:   Diagnosis Date    Other conditions influencing health status 03/03/2022    History of cough     Past Surgical History:   Procedure Laterality Date    OTHER SURGICAL HISTORY  02/04/2019    Rotator cuff repair    OTHER SURGICAL HISTORY  02/04/2019    Percutaneous transluminal coronary angioplasty    OTHER SURGICAL HISTORY  02/04/2019    Coronary artery bypass graft    OTHER SURGICAL HISTORY  02/04/2019    Inguinal hernia repair     No family history on file.  Social History     Tobacco Use    Smoking status: Never    Smokeless tobacco: Never   Vaping Use    Vaping Use: Never used   Substance Use Topics    Alcohol use: Yes     Comment: socially    Drug use: Never       Physical Exam   ED Triage Vitals   Temp Pulse Resp BP   -- -- -- --      SpO2 Temp src Heart Rate Source Patient Position   -- -- -- --      BP Location FiO2 (%)     -- --        Physical Exam  Constitutional:       Appearance: Normal appearance.   HENT:      Head: Normocephalic and atraumatic.      Right Ear: Tympanic membrane normal.      Left Ear: Tympanic membrane normal.      Nose: Nose normal.      Mouth/Throat:      Mouth: Mucous membranes are moist.   Eyes:      Extraocular Movements: Extraocular movements intact.      Pupils: Pupils are equal, round, and reactive to light.   Cardiovascular:      Rate and Rhythm: Normal rate and regular rhythm.      Pulses: Normal pulses.      Heart sounds: Normal heart sounds.   Pulmonary:      Effort: Pulmonary effort is normal.      Breath sounds: Normal breath sounds.   Abdominal:      General: Abdomen is flat.      Palpations: Abdomen is soft.   Musculoskeletal:         General: Normal range of motion.      Cervical back: Normal range of motion and neck supple.   Skin:     General: Skin is warm.      Capillary Refill: Capillary refill takes less than 2 seconds.   Neurological:      General: No focal deficit present.      Mental Status: He is alert and oriented to person, place, and time.   Psychiatric:         Mood and Affect: Mood normal.         Behavior: Behavior normal.         ED Course & MDM   Diagnoses as of 02/07/24 1802   Atrial flutter (CMS/HCC)       Medical Decision Making  EKG is atrial flutter.  I ordered a TSH, CBC CMP and troponin.  He was denying chest pain.  Chest x-ray was ordered.  At this time his rate appears to be controlled at 89 bpm.  He already received 1 dose of Eliquis from his primary care physician, Dr. Paul before he came to our emergency department. Vital signs are stable.  Patient received a liter of fluid.  TSH is 5.73.  Free T4 was ordered.  CBC no leukocytosis or left shift.  Troponin was normal at 6.  Magnesium was ordered awaiting result.  Metabolic panel is normal.  Venous blood gas appears normal.  Coags were normal.  Chest x-ray showed no acute active disease.  I sent a message to Dr. Pathak to see  if we can manage this patient outpatient or if he wanted him admitted to be seen and worked up by cardiology.  Patient was seen and staffed with attending.     Contacted on-call cardiology Dr. Pathak and notified him of patient's findings.  He felt that this patient could be cardioverted in the morning and admitted to observation.  Full report to hospital service who accepted patient.  Free T4 was normal.  Magnesium was normal.  Admitted to observation telemetry.    Notified Dr Pathak that this is a Dr Nobles patient and added Dr Nobles to consult for cardioversion.    Amount and/or Complexity of Data Reviewed  ECG/medicine tests: ordered and independent interpretation performed.     Details: EKG is 89 bpm, atrial flutter with variable AV block with premature ventricular aberrantly conducted complexes.  No ST elevation or depression.  QRS appears to be normal.  QT corrected appears to be normal.  Patient has a rare occasional PVC.  Interpreted by attending and myself.        Procedure  Procedures     CHARLEEN Atkins-ANNY  02/07/24 1742       CHARLEEN Atkins-ANNY  02/07/24 3296

## 2024-02-07 NOTE — PROGRESS NOTES
Subjective   Patient ID: Jay Rebolledo is a 73 y.o. male who presents for Constipation.    Constipation  Pertinent negatives include no abdominal pain, diarrhea, fever, nausea or vomiting.      HPI:  Patient here with wife today  Patient came to office for hospital follow-up regarding constipation.  States he has to use an enema to have any bowel movements at home.  Does not have follow-up appointment with GI.  Has appointment with urology for urinary frequency on Friday.  States he is urinating every hour.  Drinks plenty of fluids for his constipation.  Upon exam heard irregular heartbeat.  Did EKG here in the office.  Patient was found to have a flutter with PVCs with regular heart rate.  Vital signs stable.  Patient does endorse a left-sided dull chest discomfort that comes and goes from several hours to half a day sometimes at least once a week.  States he went to bed with chest pain the other night and it was gone when he woke up.  Also endorses extreme fatigue, tired all the time.  Also reports shortness of breath, mostly with exertion.  Wife reports the symptoms are more frequent than the patient reveals.  States he is under a lot of stress  Recommended going to the hospital immediately upon these findings while experiencing symptoms.  Believe he is stopping at home before he goes.  Apprehensive about going at all.  Does have history of CABG x 4, does follow-up with cardiology yearly.  Was started on Eliquis here in the office, gave 5 mg dose at 3:00.  Report was called to Lazaro SANDY at [facility].    Review of Systems   Constitutional:  Positive for fatigue. Negative for chills and fever.   HENT:  Negative for congestion, ear pain and sore throat.    Eyes:  Negative for visual disturbance.   Respiratory:  Positive for chest tightness and shortness of breath. Negative for cough.    Cardiovascular:  Positive for chest pain. Negative for palpitations and leg swelling.   Gastrointestinal:  Positive for  "constipation. Negative for abdominal pain, diarrhea, nausea and vomiting.   Genitourinary:  Positive for frequency.   Musculoskeletal: Negative.    Skin:  Negative for rash.   Neurological:  Negative for dizziness, weakness, numbness and headaches.   Psychiatric/Behavioral: Negative.         Objective   /72   Pulse 89   Ht 1.727 m (5' 8\")   Wt 73.5 kg (162 lb)   SpO2 97%   BMI 24.63 kg/m²     Physical Exam  Constitutional:       General: He is not in acute distress.     Appearance: Normal appearance.   HENT:      Head: Normocephalic and atraumatic.      Right Ear: Tympanic membrane, ear canal and external ear normal.      Left Ear: Tympanic membrane, ear canal and external ear normal.      Nose: Nose normal.      Mouth/Throat:      Mouth: Mucous membranes are moist.      Pharynx: Oropharynx is clear.   Eyes:      Extraocular Movements: Extraocular movements intact.      Conjunctiva/sclera: Conjunctivae normal.      Pupils: Pupils are equal, round, and reactive to light.   Cardiovascular:      Rate and Rhythm: Normal rate. Rhythm regularly irregular. Frequent Extrasystoles are present.     Pulses: Normal pulses.      Heart sounds: Normal heart sounds. No murmur heard.  Pulmonary:      Effort: Pulmonary effort is normal.      Breath sounds: Normal breath sounds. No wheezing, rhonchi or rales.   Abdominal:      General: Bowel sounds are normal.      Palpations: Abdomen is soft.      Tenderness: There is no abdominal tenderness.   Musculoskeletal:         General: Normal range of motion.      Cervical back: Normal range of motion and neck supple.      Right lower leg: No edema.      Left lower leg: No edema.   Lymphadenopathy:      Comments: No lymphadenopathy noted   Skin:     General: Skin is warm and dry.      Findings: No rash.   Neurological:      General: No focal deficit present.      Mental Status: He is alert and oriented to person, place, and time.      Cranial Nerves: No cranial nerve deficit.      " Coordination: Coordination normal.      Gait: Gait normal.   Psychiatric:         Mood and Affect: Mood normal.         Behavior: Behavior normal.         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 (CMS/HCC) E08.00     Increased Jardiance to 25 daily  Increase metformin to 1000 mg twice daily  Will look at restarting insulin for uncontrolled A1c  Likely will start insulin while you are in the hospital         Relevant Medications    metFORMIN (Glucophage) 500 mg tablet    empagliflozin (Jardiance) 25 mg    Other fatigue R53.83    S/P CABG x 4 Z95.1    Paraparesis (CMS/[facility]) G82.20     Stable         Constipation - Primary K59.00     Plan for constipation on hold until there is a plan for a flutter         Relevant Medications    docusate sodium (Dulcolax Stool Softener, dss,) 100 mg capsule    Irregular heartbeat I49.9    Relevant Orders    ECG 12 lead (Clinic Performed)    Atrial flutter by electrocardiogram (CMS/[facility]) I48.92     Atrial flutter with PVCs detected on ECG.  Confirmed by cardiology at [facility].  Started Eliquis 5 mg twice daily had first dose here in the office today at 3:00.  Sending patient to ED due to symptoms of chest pain, shortness of breath, and extreme fatigue, discouraged him from going home first.         Chest pain R07.9     Sending to emergency department for reports of chest pain at least once a week.  History of CABG x 4         Peripheral vascular disease, unspecified (CMS/HCC) I73.9     Stable         Malignant melanoma of right upper extremity including shoulder (CMS/HCC) C43.61     Monitored        Follow-up after this hospitalization

## 2024-02-08 LAB
ANION GAP SERPL CALC-SCNC: 11 MMOL/L (ref 10–20)
APPEARANCE UR: ABNORMAL
BACTERIA #/AREA URNS AUTO: ABNORMAL /HPF
BILIRUB UR STRIP.AUTO-MCNC: NEGATIVE MG/DL
BNP SERPL-MCNC: 73 PG/ML (ref 0–99)
BUN SERPL-MCNC: 15 MG/DL (ref 6–23)
CALCIUM SERPL-MCNC: 8.1 MG/DL (ref 8.6–10.3)
CHLORIDE SERPL-SCNC: 106 MMOL/L (ref 98–107)
CO2 SERPL-SCNC: 26 MMOL/L (ref 21–32)
COLOR UR: YELLOW
CREAT SERPL-MCNC: 0.85 MG/DL (ref 0.5–1.3)
EGFRCR SERPLBLD CKD-EPI 2021: >90 ML/MIN/1.73M*2
GLUCOSE BLD MANUAL STRIP-MCNC: 120 MG/DL (ref 74–99)
GLUCOSE BLD MANUAL STRIP-MCNC: 160 MG/DL (ref 74–99)
GLUCOSE BLD MANUAL STRIP-MCNC: 165 MG/DL (ref 74–99)
GLUCOSE BLD MANUAL STRIP-MCNC: 258 MG/DL (ref 74–99)
GLUCOSE SERPL-MCNC: 146 MG/DL (ref 74–99)
GLUCOSE UR STRIP.AUTO-MCNC: ABNORMAL MG/DL
KETONES UR STRIP.AUTO-MCNC: NEGATIVE MG/DL
LEUKOCYTE ESTERASE UR QL STRIP.AUTO: ABNORMAL
NITRITE UR QL STRIP.AUTO: POSITIVE
PH UR STRIP.AUTO: 6 [PH]
POTASSIUM SERPL-SCNC: 3.8 MMOL/L (ref 3.5–5.3)
PROT UR STRIP.AUTO-MCNC: ABNORMAL MG/DL
RBC # UR STRIP.AUTO: ABNORMAL /UL
RBC #/AREA URNS AUTO: >20 /HPF
SODIUM SERPL-SCNC: 139 MMOL/L (ref 136–145)
SP GR UR STRIP.AUTO: 1.03
UFH PPP CHRO-ACNC: 0.6 IU/ML
UFH PPP CHRO-ACNC: 0.6 IU/ML
UFH PPP CHRO-ACNC: 0.7 IU/ML
UROBILINOGEN UR STRIP.AUTO-MCNC: <2 MG/DL
WBC #/AREA URNS AUTO: >50 /HPF
WBC CLUMPS #/AREA URNS AUTO: ABNORMAL /HPF

## 2024-02-08 PROCEDURE — 2500000002 HC RX 250 W HCPCS SELF ADMINISTERED DRUGS (ALT 637 FOR MEDICARE OP, ALT 636 FOR OP/ED): Performed by: INTERNAL MEDICINE

## 2024-02-08 PROCEDURE — 1200000002 HC GENERAL ROOM WITH TELEMETRY DAILY

## 2024-02-08 PROCEDURE — 82374 ASSAY BLOOD CARBON DIOXIDE: CPT | Performed by: INTERNAL MEDICINE

## 2024-02-08 PROCEDURE — 2500000005 HC RX 250 GENERAL PHARMACY W/O HCPCS: Performed by: INTERNAL MEDICINE

## 2024-02-08 PROCEDURE — 2500000001 HC RX 250 WO HCPCS SELF ADMINISTERED DRUGS (ALT 637 FOR MEDICARE OP): Performed by: NURSE PRACTITIONER

## 2024-02-08 PROCEDURE — 2500000004 HC RX 250 GENERAL PHARMACY W/ HCPCS (ALT 636 FOR OP/ED): Performed by: INTERNAL MEDICINE

## 2024-02-08 PROCEDURE — 2500000001 HC RX 250 WO HCPCS SELF ADMINISTERED DRUGS (ALT 637 FOR MEDICARE OP): Performed by: INTERNAL MEDICINE

## 2024-02-08 PROCEDURE — 81001 URINALYSIS AUTO W/SCOPE: CPT | Performed by: INTERNAL MEDICINE

## 2024-02-08 PROCEDURE — 36415 COLL VENOUS BLD VENIPUNCTURE: CPT | Performed by: INTERNAL MEDICINE

## 2024-02-08 PROCEDURE — RXMED WILLOW AMBULATORY MEDICATION CHARGE

## 2024-02-08 PROCEDURE — 99232 SBSQ HOSP IP/OBS MODERATE 35: CPT | Performed by: INTERNAL MEDICINE

## 2024-02-08 PROCEDURE — 99222 1ST HOSP IP/OBS MODERATE 55: CPT | Performed by: INTERNAL MEDICINE

## 2024-02-08 PROCEDURE — 87086 URINE CULTURE/COLONY COUNT: CPT | Mod: GEALAB | Performed by: INTERNAL MEDICINE

## 2024-02-08 PROCEDURE — 82947 ASSAY GLUCOSE BLOOD QUANT: CPT

## 2024-02-08 PROCEDURE — 85520 HEPARIN ASSAY: CPT | Performed by: INTERNAL MEDICINE

## 2024-02-08 RX ORDER — DEXTROSE 50 % IN WATER (D50W) INTRAVENOUS SYRINGE
25
Status: DISCONTINUED | OUTPATIENT
Start: 2024-02-08 | End: 2024-02-09 | Stop reason: HOSPADM

## 2024-02-08 RX ORDER — LACTULOSE 10 G/15ML
20 SOLUTION ORAL DAILY
Status: DISCONTINUED | OUTPATIENT
Start: 2024-02-08 | End: 2024-02-09 | Stop reason: HOSPADM

## 2024-02-08 RX ORDER — INSULIN GLARGINE 100 [IU]/ML
10 INJECTION, SOLUTION SUBCUTANEOUS NIGHTLY
Status: DISCONTINUED | OUTPATIENT
Start: 2024-02-08 | End: 2024-02-09 | Stop reason: HOSPADM

## 2024-02-08 RX ORDER — METOPROLOL SUCCINATE 25 MG/1
25 TABLET, EXTENDED RELEASE ORAL DAILY
Status: DISCONTINUED | OUTPATIENT
Start: 2024-02-08 | End: 2024-02-09 | Stop reason: HOSPADM

## 2024-02-08 RX ADMIN — APIXABAN 5 MG: 5 TABLET, FILM COATED ORAL at 23:24

## 2024-02-08 RX ADMIN — INSULIN LISPRO 4 UNITS: 100 INJECTION, SOLUTION INTRAVENOUS; SUBCUTANEOUS at 08:40

## 2024-02-08 RX ADMIN — GABAPENTIN 100 MG: 100 CAPSULE ORAL at 08:39

## 2024-02-08 RX ADMIN — ATORVASTATIN CALCIUM 80 MG: 80 TABLET, FILM COATED ORAL at 20:56

## 2024-02-08 RX ADMIN — SENNOSIDES AND DOCUSATE SODIUM 1 TABLET: 50; 8.6 TABLET ORAL at 08:39

## 2024-02-08 RX ADMIN — LACTULOSE 20 G: 20 SOLUTION ORAL at 17:34

## 2024-02-08 RX ADMIN — PANTOPRAZOLE SODIUM 40 MG: 40 TABLET, DELAYED RELEASE ORAL at 06:33

## 2024-02-08 RX ADMIN — ASPIRIN 81 MG CHEWABLE TABLET 81 MG: 81 TABLET CHEWABLE at 09:00

## 2024-02-08 RX ADMIN — INSULIN GLARGINE 10 UNITS: 100 INJECTION, SOLUTION SUBCUTANEOUS at 20:55

## 2024-02-08 RX ADMIN — INSULIN LISPRO 4 UNITS: 100 INJECTION, SOLUTION INTRAVENOUS; SUBCUTANEOUS at 20:56

## 2024-02-08 RX ADMIN — TAMSULOSIN HYDROCHLORIDE 0.8 MG: 0.4 CAPSULE ORAL at 08:39

## 2024-02-08 RX ADMIN — APIXABAN 5 MG: 5 TABLET, FILM COATED ORAL at 12:17

## 2024-02-08 RX ADMIN — DOCUSATE SODIUM 100 MG: 100 CAPSULE, LIQUID FILLED ORAL at 08:38

## 2024-02-08 RX ADMIN — SODIUM PHOSPHATE 1 ENEMA: 7; 19 ENEMA RECTAL at 11:21

## 2024-02-08 RX ADMIN — METFORMIN HYDROCHLORIDE 1000 MG: 500 TABLET ORAL at 08:39

## 2024-02-08 RX ADMIN — GABAPENTIN 100 MG: 100 CAPSULE ORAL at 20:56

## 2024-02-08 RX ADMIN — GABAPENTIN 100 MG: 100 CAPSULE ORAL at 15:10

## 2024-02-08 RX ADMIN — INSULIN LISPRO 12 UNITS: 100 INJECTION, SOLUTION INTRAVENOUS; SUBCUTANEOUS at 16:07

## 2024-02-08 ASSESSMENT — COGNITIVE AND FUNCTIONAL STATUS - GENERAL
MOBILITY SCORE: 24
DAILY ACTIVITIY SCORE: 24
MOBILITY SCORE: 24
DAILY ACTIVITIY SCORE: 24

## 2024-02-08 ASSESSMENT — PAIN SCALES - GENERAL
PAINLEVEL_OUTOF10: 0 - NO PAIN

## 2024-02-08 ASSESSMENT — ENCOUNTER SYMPTOMS
CHEST TIGHTNESS: 1
WEAKNESS: 1
CONSTIPATION: 1
MUSCULOSKELETAL NEGATIVE: 1
VOMITING: 0
HEMATOLOGIC/LYMPHATIC NEGATIVE: 1
WEAKNESS: 0
PALPITATIONS: 0
DIFFICULTY URINATING: 1
NAUSEA: 0
ABDOMINAL PAIN: 0
COUGH: 0
ACTIVITY CHANGE: 1
NUMBNESS: 1
UNEXPECTED WEIGHT CHANGE: 1
CHILLS: 0
FREQUENCY: 1
EYES NEGATIVE: 1
DIZZINESS: 0
TREMORS: 0
PSYCHIATRIC NEGATIVE: 1
HEADACHES: 0
FEVER: 0
ALLERGIC/IMMUNOLOGIC NEGATIVE: 1
FATIGUE: 1
SHORTNESS OF BREATH: 1

## 2024-02-08 ASSESSMENT — PAIN - FUNCTIONAL ASSESSMENT
PAIN_FUNCTIONAL_ASSESSMENT: 0-10
PAIN_FUNCTIONAL_ASSESSMENT: 0-10

## 2024-02-08 NOTE — PROGRESS NOTES
02/08/24 1056   Discharge Planning   Living Arrangements Spouse/significant other   Support Systems Spouse/significant other   Assistance Needed patient is alert and oriented x3, independent in ADL's, no AD or DME in the home   Type of Residence Private residence   Home or Post Acute Services None   Patient expects to be discharged to: Home patient denies any homegoing needs upon discharge   Does the patient need discharge transport arranged? Yes   RoundTrip coordination needed? Yes   Has discharge transport been arranged? No     2/8/2024 1625: made aware patient will discharge on new eliquis- requested provider submit to [facility] for pricing.

## 2024-02-08 NOTE — H&P
History Of Present Illness  Jay Rebolledo is a 73 y.o. male presenting with shortness of breath and chest discomfort that he has been experiencing for the last week or so. First noticed it when he was moving wood-did not really resolve until he sat and went to bed. He says he had also had it previously a few times, but could not specify. He admits to being very fatigued for the last 3-4 weeks as well. The pain in his chest was left sided and aching in nature. No pnd, orthopnea, leg swelling. He does have rest pain at night in both calves. Denies lightheadedness. He has history of CAD, and diabetes which is not uncontrolled.      Past Medical History  DM, poorly controlled. Last glycohgb over 12  BPH  CAD  Melanoma  Peripheral neuropathy  Hypertension  GERD  Carotid disease      Surgical History  CABG, 4 vessel  Hernia repair  Left rotator cuff repair  Carpal tunnel release     Social History  He reports that he has never smoked. He has never used smokeless tobacco. He reports current alcohol use. He reports that he does not use drugs.    Family History  DM  Hypertension  Malignant melanoma     Allergies  Penicillins    Review of Systems   Constitutional:  Positive for activity change and fatigue.   HENT: Negative.     Eyes: Negative.    Respiratory:  Positive for chest tightness and shortness of breath.    Cardiovascular:  Positive for chest pain.   Gastrointestinal:  Positive for constipation.   Endocrine: Positive for polyuria.   Genitourinary:  Positive for difficulty urinating and frequency.   Musculoskeletal: Negative.    Skin: Negative.    Allergic/Immunologic: Negative.    Neurological:  Positive for weakness.   Hematological: Negative.    Psychiatric/Behavioral: Negative.          Physical Exam  Constitutional:       Appearance: Normal appearance.   HENT:      Head: Normocephalic and atraumatic.      Nose: Nose normal.      Mouth/Throat:      Mouth: Mucous membranes are moist.   Eyes:      Extraocular  Movements: Extraocular movements intact.      Pupils: Pupils are equal, round, and reactive to light.      Comments: Glasses in place   Neck:      Comments: No adenopathy, jvd.   Soft bilateral carotid bruits  Cardiovascular:      Rate and Rhythm: Normal rate and regular rhythm.      Comments: Faint distal pulses  Pulmonary:      Effort: Pulmonary effort is normal.      Breath sounds: Normal breath sounds.   Abdominal:      General: Bowel sounds are normal.      Palpations: Abdomen is soft.      Comments: No tenderness   Musculoskeletal:      Comments: Trace pitting edema RLE, none left  No hair growth  No calf tenderness, homans   Skin:     General: Skin is warm and dry.   Neurological:      General: No focal deficit present.      Mental Status: He is alert and oriented to person, place, and time.   Psychiatric:         Mood and Affect: Mood normal.         Behavior: Behavior normal.       No current facility-administered medications on file prior to encounter.     Current Outpatient Medications on File Prior to Encounter   Medication Sig Dispense Refill    aspirin 81 mg chewable tablet Chew 1 tablet (81 mg) once daily. 30 tablet 11    clotrimazole (Lotrimin) 1 % external solution Apply topically 2 times a day. 30 mL 1    docusate sodium (Dulcolax Stool Softener, dss,) 100 mg capsule Take 1 capsule (100 mg) by mouth 3 times a day as needed for constipation. 30 capsule 1    empagliflozin (Jardiance) 25 mg Take 1 tablet (25 mg) by mouth once daily. (Patient taking differently: Take 1 tablet (25 mg) by mouth once daily in the morning.) 90 tablet 1    gabapentin (Neurontin) 100 mg capsule Take 1 capsule (100 mg) by mouth 3 times a day. (Patient taking differently: Take 1 capsule (100 mg) by mouth 3 times a day. Last OARRS fill: 1/10/24 #90 for 30 days) 90 capsule 1    linaCLOtide (Linzess) 72 mcg capsule Take 1 capsule (72 mcg) by mouth once daily in the morning. Take before meals. Do not crush or chew. (Patient not  taking: Reported on 2/7/2024) 30 capsule 2    lubiprostone (Amitiza) 24 mcg capsule Take 1 capsule (24 mcg) by mouth 2 times a day with meals. Take with meals 60 capsule 2    metFORMIN (Glucophage) 500 mg tablet Take 2 tablets (1,000 mg) by mouth 2 times a day with meals. (Patient taking differently: Take 2 tablets (1,000 mg) by mouth 2 times a day.) 180 tablet 1    omeprazole (PriLOSEC) 20 mg DR capsule Take 1 capsule (20 mg) by mouth once daily. Do not crush or chew. 30 capsule 5    plecanatide (Trulance) tablet tablet Take 1 tablet (3 mg) by mouth once daily. (Patient taking differently: Take 1 tablet (3 mg) by mouth once daily in the morning.) 30 tablet 2    sennosides-docusate sodium (Stefanie-Colace) 8.6-50 mg tablet Take 1 tablet by mouth once daily. 30 tablet 11    tamsulosin (Flomax) 0.4 mg 24 hr capsule Take 2 capsules (0.8 mg) by mouth once daily. (Patient taking differently: Take 2 capsules (0.8 mg) by mouth once daily in the morning.) 60 capsule 11    [DISCONTINUED] cyanocobalamin (Vitamin B-12) 1,000 mcg tablet Take 1 tablet (1,000 mcg) by mouth once daily.      [DISCONTINUED] empagliflozin (Jardiance) 10 mg Take 1 tablet (10 mg) by mouth once daily. 30 tablet 5    [DISCONTINUED] metFORMIN (Glucophage) 500 mg tablet Take 1 tablet (500 mg) by mouth 3 times a day with meals. 90 tablet 11      Results for orders placed or performed during the hospital encounter of 02/07/24 (from the past 24 hour(s))   CBC and Auto Differential   Result Value Ref Range    WBC 7.9 4.4 - 11.3 x10*3/uL    nRBC 0.0 0.0 - 0.0 /100 WBCs    RBC 4.89 4.50 - 5.90 x10*6/uL    Hemoglobin 14.0 13.5 - 17.5 g/dL    Hematocrit 43.1 41.0 - 52.0 %    MCV 88 80 - 100 fL    MCH 28.6 26.0 - 34.0 pg    MCHC 32.5 32.0 - 36.0 g/dL    RDW 13.6 11.5 - 14.5 %    Platelets 272 150 - 450 x10*3/uL    Neutrophils % 66.7 40.0 - 80.0 %    Immature Granulocytes %, Automated 0.4 0.0 - 0.9 %    Lymphocytes % 21.5 13.0 - 44.0 %    Monocytes % 7.0 2.0 - 10.0 %     Eosinophils % 3.9 0.0 - 6.0 %    Basophils % 0.5 0.0 - 2.0 %    Neutrophils Absolute 5.23 1.60 - 5.50 x10*3/uL    Immature Granulocytes Absolute, Automated 0.03 0.00 - 0.50 x10*3/uL    Lymphocytes Absolute 1.69 0.80 - 3.00 x10*3/uL    Monocytes Absolute 0.55 0.05 - 0.80 x10*3/uL    Eosinophils Absolute 0.31 0.00 - 0.40 x10*3/uL    Basophils Absolute 0.04 0.00 - 0.10 x10*3/uL   Magnesium   Result Value Ref Range    Magnesium 2.09 1.60 - 2.40 mg/dL   Comprehensive metabolic panel   Result Value Ref Range    Glucose 78 74 - 99 mg/dL    Sodium 139 136 - 145 mmol/L    Potassium 3.6 3.5 - 5.3 mmol/L    Chloride 103 98 - 107 mmol/L    Bicarbonate 25 21 - 32 mmol/L    Anion Gap 15 10 - 20 mmol/L    Urea Nitrogen 20 6 - 23 mg/dL    Creatinine 0.90 0.50 - 1.30 mg/dL    eGFR >90 >60 mL/min/1.73m*2    Calcium 9.1 8.6 - 10.3 mg/dL    Albumin 3.7 3.4 - 5.0 g/dL    Alkaline Phosphatase 100 33 - 136 U/L    Total Protein 7.2 6.4 - 8.2 g/dL    AST 14 9 - 39 U/L    Bilirubin, Total 0.4 0.0 - 1.2 mg/dL    ALT 14 10 - 52 U/L   Coagulation Screen   Result Value Ref Range    Protime 11.5 9.8 - 12.8 seconds    INR 1.0 0.9 - 1.1    aPTT 35 27 - 38 seconds   Troponin I, High Sensitivity   Result Value Ref Range    Troponin I, High Sensitivity 6 0 - 20 ng/L   TSH with reflex to Free T4 if abnormal   Result Value Ref Range    Thyroid Stimulating Hormone 5.73 (H) 0.44 - 3.98 mIU/L   BLOOD GAS VENOUS   Result Value Ref Range    POCT pH, Venous 7.38 7.33 - 7.43 pH    POCT pCO2, Venous 49 41 - 51 mm Hg    POCT pO2, Venous 39 35 - 45 mm Hg    POCT SO2, Venous 68 45 - 75 %    POCT Oxy Hemoglobin, Venous 66.8 45.0 - 75.0 %    POCT Base Excess, Venous 3.0 -2.0 - 3.0 mmol/L    POCT HCO3 Calculated, Venous 29.0 (H) 22.0 - 26.0 mmol/L    Patient Temperature      FiO2 21 %   Thyroxine, Free   Result Value Ref Range    Thyroxine, Free 0.72 0.61 - 1.12 ng/dL   POCT GLUCOSE   Result Value Ref Range    POCT Glucose 229 (H) 74 - 99 mg/dL   Heparin Assay, UFH  "  Result Value Ref Range    Heparin Unfractionated 0.7 See Comment Below for Therapeutic Ranges IU/mL         Last Recorded Vitals  Blood pressure 127/81, pulse 88, temperature 36.4 °C (97.5 °F), temperature source Temporal, resp. rate (!) 22, height 1.727 m (5' 8\"), weight 73.4 kg (161 lb 13.1 oz), SpO2 97 %.    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 Anticoagulate, cards consult. Echo, possible cardioversion. Tele. Slight bump in trop, known hx of CAD. Has been having symptoms at home consistent with angina. Start statin, asa  DM, poorly controlled. He is unable to afford jardiance, and another med as well. Admits sugars are running up to 400 at times. Will ask Dr Tony to see. I think starting insulin will be the best bet, and perhaps cheapest to get his sugars under better control.   Hypertension, stable at this time  Constipation, new issue. This is a change in bowel habits, over last couple weeks. Unable to afford meds that GI recommended, which were IBS meds. Will need endoscopy at some point, but will work on good bowel regimen while here  PVD/Carotid disease , followed by Dr Nobles  BPH       I spent 40 minutes in the professional and overall care of this patient.      Ananya Ross MD    "

## 2024-02-08 NOTE — PROGRESS NOTES
Jay Rebolledo is a 73 y.o. male on day 1 of admission presenting with Atrial flutter (CMS/HCC).      Subjective   Frustrated, but willing to go with plan today. Still no bm       Objective     Last Recorded Vitals  /76   Pulse 90   Temp 36.6 °C (97.9 °F) (Temporal)   Resp 16   Wt 73.4 kg (161 lb 13.1 oz)   SpO2 97%   Intake/Output last 3 Shifts:    Intake/Output Summary (Last 24 hours) at 2/8/2024 1119  Last data filed at 2/8/2024 0223  Gross per 24 hour   Intake 50.27 ml   Output --   Net 50.27 ml       Admission Weight  Weight: 73.4 kg (161 lb 13.1 oz) (02/07/24 1617)    Daily Weight  02/07/24 : 73.4 kg (161 lb 13.1 oz)    Image Results  XR chest 1 view  Narrative: Interpreted By:  Dale Muñoz,   STUDY:  XR CHEST 1 VIEW;  2/7/2024 4:21 pm      INDICATION:  Signs/Symptoms:weakness.      COMPARISON:  02/05/2019      ACCESSION NUMBER(S):  [id number]      ORDERING CLINICIAN:  MARJORIE GUZMAN      FINDINGS:  Post sternotomy. The lungs appear clear. No apparent pleural  effusion. Unremarkable cardiomediastinal silhouette. No pulmonary  vascular congestion.      Impression: No active disease in the chest.          Signed by: Dale Muñoz 2/7/2024 4:32 PM  Dictation workstation:   [id number]  ECG 12 lead (Clinic Performed)  Atrial flutter with PVC's regular rate    No current facility-administered medications on file prior to encounter.     Current Outpatient Medications on File Prior to Encounter   Medication Sig Dispense Refill    aspirin 81 mg chewable tablet Chew 1 tablet (81 mg) once daily. 30 tablet 11    clotrimazole (Lotrimin) 1 % external solution Apply topically 2 times a day. 30 mL 1    docusate sodium (Dulcolax Stool Softener, dss,) 100 mg capsule Take 1 capsule (100 mg) by mouth 3 times a day as needed for constipation. 30 capsule 1    empagliflozin (Jardiance) 25 mg Take 1 tablet (25 mg) by mouth once daily. (Patient taking differently: Take 1 tablet (25 mg) by mouth once daily in the morning.)  90 tablet 1    gabapentin (Neurontin) 100 mg capsule Take 1 capsule (100 mg) by mouth 3 times a day. (Patient taking differently: Take 1 capsule (100 mg) by mouth 3 times a day. Last OAR[place] fill: 1/10/24 #90 for 30 days) 90 capsule 1    linaCLOtide (Linzess) 72 mcg capsule Take 1 capsule (72 mcg) by mouth once daily in the morning. Take before meals. Do not crush or chew. (Patient not taking: Reported on 2/7/2024) 30 capsule 2    lubiprostone (Amitiza) 24 mcg capsule Take 1 capsule (24 mcg) by mouth 2 times a day with meals. Take with meals 60 capsule 2    metFORMIN (Glucophage) 500 mg tablet Take 2 tablets (1,000 mg) by mouth 2 times a day with meals. (Patient taking differently: Take 2 tablets (1,000 mg) by mouth 2 times a day.) 180 tablet 1    omeprazole (PriLOSEC) 20 mg DR capsule Take 1 capsule (20 mg) by mouth once daily. Do not crush or chew. 30 capsule 5    plecanatide (Trulance) tablet tablet Take 1 tablet (3 mg) by mouth once daily. (Patient taking differently: Take 1 tablet (3 mg) by mouth once daily in the morning.) 30 tablet 2    sennosides-docusate sodium (Stefanie-Colace) 8.6-50 mg tablet Take 1 tablet by mouth once daily. 30 tablet 11    tamsulosin (Flomax) 0.4 mg 24 hr capsule Take 2 capsules (0.8 mg) by mouth once daily. (Patient taking differently: Take 2 capsules (0.8 mg) by mouth once daily in the morning.) 60 capsule 11    [DISCONTINUED] cyanocobalamin (Vitamin B-12) 1,000 mcg tablet Take 1 tablet (1,000 mcg) by mouth once daily.      [DISCONTINUED] empagliflozin (Jardiance) 10 mg Take 1 tablet (10 mg) by mouth once daily. 30 tablet 5    [DISCONTINUED] metFORMIN (Glucophage) 500 mg tablet Take 1 tablet (500 mg) by mouth 3 times a day with meals. 90 tablet 11      Results for orders placed or performed during the hospital encounter of 02/07/24 (from the past 24 hour(s))   CBC and Auto Differential   Result Value Ref Range    WBC 7.9 4.4 - 11.3 x10*3/uL    nRBC 0.0 0.0 - 0.0 /100 WBCs    RBC 4.89 4.50  - 5.90 x10*6/uL    Hemoglobin 14.0 13.5 - 17.5 g/dL    Hematocrit 43.1 41.0 - 52.0 %    MCV 88 80 - 100 fL    MCH 28.6 26.0 - 34.0 pg    MCHC 32.5 32.0 - 36.0 g/dL    RDW 13.6 11.5 - 14.5 %    Platelets 272 150 - 450 x10*3/uL    Neutrophils % 66.7 40.0 - 80.0 %    Immature Granulocytes %, Automated 0.4 0.0 - 0.9 %    Lymphocytes % 21.5 13.0 - 44.0 %    Monocytes % 7.0 2.0 - 10.0 %    Eosinophils % 3.9 0.0 - 6.0 %    Basophils % 0.5 0.0 - 2.0 %    Neutrophils Absolute 5.23 1.60 - 5.50 x10*3/uL    Immature Granulocytes Absolute, Automated 0.03 0.00 - 0.50 x10*3/uL    Lymphocytes Absolute 1.69 0.80 - 3.00 x10*3/uL    Monocytes Absolute 0.55 0.05 - 0.80 x10*3/uL    Eosinophils Absolute 0.31 0.00 - 0.40 x10*3/uL    Basophils Absolute 0.04 0.00 - 0.10 x10*3/uL   Magnesium   Result Value Ref Range    Magnesium 2.09 1.60 - 2.40 mg/dL   Comprehensive metabolic panel   Result Value Ref Range    Glucose 78 74 - 99 mg/dL    Sodium 139 136 - 145 mmol/L    Potassium 3.6 3.5 - 5.3 mmol/L    Chloride 103 98 - 107 mmol/L    Bicarbonate 25 21 - 32 mmol/L    Anion Gap 15 10 - 20 mmol/L    Urea Nitrogen 20 6 - 23 mg/dL    Creatinine 0.90 0.50 - 1.30 mg/dL    eGFR >90 >60 mL/min/1.73m*2    Calcium 9.1 8.6 - 10.3 mg/dL    Albumin 3.7 3.4 - 5.0 g/dL    Alkaline Phosphatase 100 33 - 136 U/L    Total Protein 7.2 6.4 - 8.2 g/dL    AST 14 9 - 39 U/L    Bilirubin, Total 0.4 0.0 - 1.2 mg/dL    ALT 14 10 - 52 U/L   Coagulation Screen   Result Value Ref Range    Protime 11.5 9.8 - 12.8 seconds    INR 1.0 0.9 - 1.1    aPTT 35 27 - 38 seconds   Troponin I, High Sensitivity   Result Value Ref Range    Troponin I, High Sensitivity 6 0 - 20 ng/L   TSH with reflex to Free T4 if abnormal   Result Value Ref Range    Thyroid Stimulating Hormone 5.73 (H) 0.44 - 3.98 mIU/L   BLOOD GAS VENOUS   Result Value Ref Range    POCT pH, Venous 7.38 7.33 - 7.43 pH    POCT pCO2, Venous 49 41 - 51 mm Hg    POCT pO2, Venous 39 35 - 45 mm Hg    POCT SO2, Venous 68 45 -  75 %    POCT Oxy Hemoglobin, Venous 66.8 45.0 - 75.0 %    POCT Base Excess, Venous 3.0 -2.0 - 3.0 mmol/L    POCT HCO3 Calculated, Venous 29.0 (H) 22.0 - 26.0 mmol/L    Patient Temperature      FiO2 21 %   Thyroxine, Free   Result Value Ref Range    Thyroxine, Free 0.72 0.61 - 1.12 ng/dL   POCT GLUCOSE   Result Value Ref Range    POCT Glucose 229 (H) 74 - 99 mg/dL   Heparin Assay, UFH   Result Value Ref Range    Heparin Unfractionated 0.7 See Comment Below for Therapeutic Ranges IU/mL   Heparin Assay, UFH   Result Value Ref Range    Heparin Unfractionated 0.6 See Comment Below for Therapeutic Ranges IU/mL   Basic Metabolic Panel   Result Value Ref Range    Glucose 146 (H) 74 - 99 mg/dL    Sodium 139 136 - 145 mmol/L    Potassium 3.8 3.5 - 5.3 mmol/L    Chloride 106 98 - 107 mmol/L    Bicarbonate 26 21 - 32 mmol/L    Anion Gap 11 10 - 20 mmol/L    Urea Nitrogen 15 6 - 23 mg/dL    Creatinine 0.85 0.50 - 1.30 mg/dL    eGFR >90 >60 mL/min/1.73m*2    Calcium 8.1 (L) 8.6 - 10.3 mg/dL   POCT GLUCOSE   Result Value Ref Range    POCT Glucose 160 (H) 74 - 99 mg/dL   Heparin Assay   Result Value Ref Range    Heparin Unfractionated 0.6 See Comment Below for Therapeutic Ranges IU/mL        Physical Exam  Constitutional:       Appearance: Normal appearance.   HENT:      Head: Normocephalic.      Nose: Nose normal.      Mouth/Throat:      Mouth: Mucous membranes are moist.   Eyes:      Extraocular Movements: Extraocular movements intact.      Pupils: Pupils are equal, round, and reactive to light.   Cardiovascular:      Rate and Rhythm: Normal rate. Rhythm irregular.      Pulses: Normal pulses.   Pulmonary:      Effort: Pulmonary effort is normal.      Breath sounds: Normal breath sounds.   Abdominal:      General: Bowel sounds are normal.      Palpations: Abdomen is soft.   Musculoskeletal:         General: Normal range of motion.   Skin:     General: Skin is warm and dry.   Neurological:      General: No focal deficit present.       Mental Status: He is alert.   Psychiatric:         Mood and Affect: Mood normal.         Behavior: Behavior normal.     elevant Results       Assessment/Plan      This patient has a urinary catheter   Reason for the urinary catheter remaining today?     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 Appreciate cards input. Rate controlled. Anticoagulation  to be continued, change to oral agents. Outpt cardioversions. Echo today  Constipation. This is recent change in bowel habits. Requested enema. Will need to give name of other gi people for him to follow up with.   DM. Appreciate endocrine input. Resuming insulin seems to be in his best interest. Knows how to give injections et.   BPH              Ananya Ross MD

## 2024-02-08 NOTE — CONSULTS
Inpatient consult to Cardiology  Consult performed by: CHARLEEN Hedrick-CNP  Consult ordered by: Ananya Ross MD  Reason for consult: aflutter          History Of Present Illness  Jay Rebolledo is a 73 y.o. male presenting from his PCP's office with new finding atrial flutter. He was following up at his PCP for constipation and was found to have irregular heart sounds. EKG was obtained which showed atrial flutter. He was given a dose of Eliquis in the office and advised to go to the ER. Pt denies any palpitations. He does report having chest pain and shortness of breath last week while cutting wood. The CP and SOB lasted all day and was gone the next day when he woke up. He does report swelling to his feet.     Lab work in the ER showed glucose 78, sodium 139, potassium 3.6, BUN/Cr 20/0.90, TSH 5.73, INR 1.0, troponin negative, BNP 73, magnesium 2.0, WBC 7.9, H&H 14/43.1, CXR negative.     EKG showed rate controlled atrial flutter.     He was started on a heparin gtt and admitted for further care.     Past Medical History  Past Medical History:   Diagnosis Date    Other conditions influencing health status 2022    History of cough   CAD s/p MI with stent in , 4 vessel CABG in  at [facility], hypertension, hyperlipidemia, diabetes type 2, arthritis, sleep apnea, bradycardia, constipation, and melanoma    Surgical History  Past Surgical History:   Procedure Laterality Date    OTHER SURGICAL HISTORY  2019    Rotator cuff repair    OTHER SURGICAL HISTORY  2019    Percutaneous transluminal coronary angioplasty    OTHER SURGICAL HISTORY  2019    Coronary artery bypass graft    OTHER SURGICAL HISTORY  2019    Inguinal hernia repair        Social History  He reports that he has never smoked. He has never used smokeless tobacco. He reports current alcohol use. He reports that he does not use drugs.    Family History  Cancer: yes  Mother had melanoma   CAD: yes  Father  of  "massive heart attack at 68   Diabetes: yes        Allergies  Penicillins    Review of Systems  Review of Systems   Constitutional:  Negative for chills and fever.   Respiratory:  Positive for shortness of breath. Negative for cough.    Cardiovascular:  Positive for chest pain and leg swelling. Negative for palpitations.   Gastrointestinal:  Negative for abdominal pain.   Neurological:  Negative for dizziness and weakness.   All other systems reviewed and are negative.         Physical Exam  Constitutional: Well developed, awake/alert/oriented x3, no distress, alert and cooperative  Eyes: PERRL, EOMI, clear sclera  ENMT: mucous membranes moist, no apparent injury, no lesions seen  Head/Neck: Neck supple, no apparent injury, thyroid without mass or tenderness, No JVD, trachea midline, no bruits  Respiratory/Thorax: Patent airways, CTAB, normal breath sounds with good chest expansion, thorax symmetric  Cardiovascular: irregular rhythm, no murmurs, 2+ equal pulses of the extremities, normal S 1and S 2  Gastrointestinal: Nondistended, soft, non-tender, no rebound tenderness or guarding, no masses palpable, no organomegaly, +BS, no bruits  Musculoskeletal: ROM intact, no joint swelling, normal strength  Extremities: +1 pitting edema BLE  Neurological: alert and oriented x3, intact senses, motor, response and reflexes, normal strength  Lymphatic: No significant lymphadenopathy  Psychological: Appropriate mood and behavior  Skin: Warm and dry, no lesions, no rashes       Last Recorded Vitals  Blood pressure 110/76, pulse 90, temperature 36.6 °C (97.9 °F), temperature source Temporal, resp. rate 16, height 1.727 m (5' 8\"), weight 73.4 kg (161 lb 13.1 oz), SpO2 97 %.    Relevant Results    Scheduled medications  apixaban, 5 mg, oral, q12h  aspirin, 81 mg, oral, Daily  atorvastatin, 80 mg, oral, Nightly  docusate sodium, 100 mg, oral, BID  gabapentin, 100 mg, oral, TID  insulin glargine, 10 Units, subcutaneous, " Nightly  insulin lispro, 0-20 Units, subcutaneous, Before meals & nightly  [Held by provider] metFORMIN, 1,000 mg, oral, BID with meals  pantoprazole, 40 mg, oral, Daily before breakfast  polyethylene glycol, 17 g, oral, Daily  sennosides-docusate sodium, 1 tablet, oral, Daily  tamsulosin, 0.8 mg, oral, Daily      Continuous medications     PRN medications  PRN medications: acetaminophen, dextrose 10 % in water (D10W), dextrose, glucagon, melatonin, nitroglycerin    Results for orders placed or performed during the hospital encounter of 02/07/24 (from the past 24 hour(s))   CBC and Auto Differential   Result Value Ref Range    WBC 7.9 4.4 - 11.3 x10*3/uL    nRBC 0.0 0.0 - 0.0 /100 WBCs    RBC 4.89 4.50 - 5.90 x10*6/uL    Hemoglobin 14.0 13.5 - 17.5 g/dL    Hematocrit 43.1 41.0 - 52.0 %    MCV 88 80 - 100 fL    MCH 28.6 26.0 - 34.0 pg    MCHC 32.5 32.0 - 36.0 g/dL    RDW 13.6 11.5 - 14.5 %    Platelets 272 150 - 450 x10*3/uL    Neutrophils % 66.7 40.0 - 80.0 %    Immature Granulocytes %, Automated 0.4 0.0 - 0.9 %    Lymphocytes % 21.5 13.0 - 44.0 %    Monocytes % 7.0 2.0 - 10.0 %    Eosinophils % 3.9 0.0 - 6.0 %    Basophils % 0.5 0.0 - 2.0 %    Neutrophils Absolute 5.23 1.60 - 5.50 x10*3/uL    Immature Granulocytes Absolute, Automated 0.03 0.00 - 0.50 x10*3/uL    Lymphocytes Absolute 1.69 0.80 - 3.00 x10*3/uL    Monocytes Absolute 0.55 0.05 - 0.80 x10*3/uL    Eosinophils Absolute 0.31 0.00 - 0.40 x10*3/uL    Basophils Absolute 0.04 0.00 - 0.10 x10*3/uL   Magnesium   Result Value Ref Range    Magnesium 2.09 1.60 - 2.40 mg/dL   Comprehensive metabolic panel   Result Value Ref Range    Glucose 78 74 - 99 mg/dL    Sodium 139 136 - 145 mmol/L    Potassium 3.6 3.5 - 5.3 mmol/L    Chloride 103 98 - 107 mmol/L    Bicarbonate 25 21 - 32 mmol/L    Anion Gap 15 10 - 20 mmol/L    Urea Nitrogen 20 6 - 23 mg/dL    Creatinine 0.90 0.50 - 1.30 mg/dL    eGFR >90 >60 mL/min/1.73m*2    Calcium 9.1 8.6 - 10.3 mg/dL    Albumin 3.7 3.4 -  5.0 g/dL    Alkaline Phosphatase 100 33 - 136 U/L    Total Protein 7.2 6.4 - 8.2 g/dL    AST 14 9 - 39 U/L    Bilirubin, Total 0.4 0.0 - 1.2 mg/dL    ALT 14 10 - 52 U/L   Coagulation Screen   Result Value Ref Range    Protime 11.5 9.8 - 12.8 seconds    INR 1.0 0.9 - 1.1    aPTT 35 27 - 38 seconds   Troponin I, High Sensitivity   Result Value Ref Range    Troponin I, High Sensitivity 6 0 - 20 ng/L   TSH with reflex to Free T4 if abnormal   Result Value Ref Range    Thyroid Stimulating Hormone 5.73 (H) 0.44 - 3.98 mIU/L   BLOOD GAS VENOUS   Result Value Ref Range    POCT pH, Venous 7.38 7.33 - 7.43 pH    POCT pCO2, Venous 49 41 - 51 mm Hg    POCT pO2, Venous 39 35 - 45 mm Hg    POCT SO2, Venous 68 45 - 75 %    POCT Oxy Hemoglobin, Venous 66.8 45.0 - 75.0 %    POCT Base Excess, Venous 3.0 -2.0 - 3.0 mmol/L    POCT HCO3 Calculated, Venous 29.0 (H) 22.0 - 26.0 mmol/L    Patient Temperature      FiO2 21 %   Thyroxine, Free   Result Value Ref Range    Thyroxine, Free 0.72 0.61 - 1.12 ng/dL   B-type Natriuretic Peptide   Result Value Ref Range    BNP 73 0 - 99 pg/mL   POCT GLUCOSE   Result Value Ref Range    POCT Glucose 229 (H) 74 - 99 mg/dL   Heparin Assay, UFH   Result Value Ref Range    Heparin Unfractionated 0.7 See Comment Below for Therapeutic Ranges IU/mL   Heparin Assay, UFH   Result Value Ref Range    Heparin Unfractionated 0.6 See Comment Below for Therapeutic Ranges IU/mL   Basic Metabolic Panel   Result Value Ref Range    Glucose 146 (H) 74 - 99 mg/dL    Sodium 139 136 - 145 mmol/L    Potassium 3.8 3.5 - 5.3 mmol/L    Chloride 106 98 - 107 mmol/L    Bicarbonate 26 21 - 32 mmol/L    Anion Gap 11 10 - 20 mmol/L    Urea Nitrogen 15 6 - 23 mg/dL    Creatinine 0.85 0.50 - 1.30 mg/dL    eGFR >90 >60 mL/min/1.73m*2    Calcium 8.1 (L) 8.6 - 10.3 mg/dL   POCT GLUCOSE   Result Value Ref Range    POCT Glucose 160 (H) 74 - 99 mg/dL   Heparin Assay   Result Value Ref Range    Heparin Unfractionated 0.6 See Comment Below for  Therapeutic Ranges IU/mL   POCT GLUCOSE   Result Value Ref Range    POCT Glucose 120 (H) 74 - 99 mg/dL       XR chest 1 view   Final Result   No active disease in the chest.             Signed by: Dale Muñoz 2/7/2024 4:32 PM   Dictation workstation:   [id number]      Transthoracic Echo (TTE) Complete    (Results Pending)          Assessment/Plan   Echocardiogram from February 2023 reviewed, LVEF 50%, mildly dilate aortic root, mild AI, mild to mod MR, mild TR, LVH and abnormal relaxation      Paroxysmal atrial flutter-new finding  -EKG reviewed, rate controlled atrial flutter  -asymptomatic, unsure of onset date  -Started on heparin gtt in ER  -CXR negative  -TSH 5.73  -Troponin  -Electrolytes WNL  -Telemetry reviewed, remains in Aflutter, rate 80-90's  -Start low dose metoprolol->monitor closely, has a hx of bradycardia  -Stop heparin gtt  -Start Eliquis 5mg BID  -Check echo  -Plan for outpt cardioversion in 3-4 weeks    2. Hx of CAD   -s/p MI with stent in 1997, 4 vessel CABG in 2014  -Does report chest pain and SOB last week  -Troponin negative  -I reviewed the EKG, Atrial flutter, no acute changes, ST elevation, or depression  -Cont asa, metoprolol, statin  -Check echo  -Plan for outpt stress test once in SR    3. Diabetes Mellitus type 2  -ISS  -Accuchecks  -Lantus  -Metformin on hold by attending  -Hgb A1C Jan 2024 12.8%  -Endocrine consulted    4. Hyperlipidemia  -Lipid panel reviewed  -Cont statin    CHARLEEN Hedrick-CNP

## 2024-02-08 NOTE — CONSULTS
Inpatient consult to Endocrinology  Consult performed by: Thomas Yeung MD  Consult ordered by: Ananya Ross MD      Reason For Consult  Diabetes    History Of Present Illness  Jay Rebolledo is a 73 y.o. male admitted yesterday with atrial flutter.     Duration of type 2 diabetes mellitus:  3 years  Complications:  peripheral neuropathy and cardiovascular disease    Patient states he was on 70/30 insulin until recent outpatient follow up 3 weeks, ago when insulin was discontinued in favor or oral agents  He took his own dose of 70/30 yesterday before coming to the hospital.    Weight gain since that change  Frequent urination  Recent episode of severe constipation contributing to urethral compression.   No history of UTI    Home regimen:  Metformin 1000 mg BID  Empagliflozin 25 mg/day     Latest Reference Range & Units 01/30/24 09:12   Hemoglobin A1C see below % 12.8 (H)         Medications    Current Facility-Administered Medications:     acetaminophen (Tylenol) tablet 650 mg, 650 mg, oral, q6h PRN, Ananya Ross MD    aspirin chewable tablet 81 mg, 81 mg, oral, Daily, Ananya Ross MD, 81 mg at 02/08/24 0900    atorvastatin (Lipitor) tablet 80 mg, 80 mg, oral, Nightly, Ananya Ross MD, 80 mg at 02/07/24 2228    dextrose 10 % in water (D10W) infusion, 0.3 g/kg/hr, intravenous, Once PRN, Ananya Ross MD    dextrose 50 % injection 25 g, 25 g, intravenous, q15 min PRN, Ananya Ross MD    docusate sodium (Colace) capsule 100 mg, 100 mg, oral, BID, Ananya Ross MD, 100 mg at 02/08/24 0838    gabapentin (Neurontin) capsule 100 mg, 100 mg, oral, TID, Ananya Ross MD, 100 mg at 02/08/24 0839    glucagon (Glucagen) injection 1 mg, 1 mg, intramuscular, q15 min PRN, Ananya Ross MD    heparin 25,000 Units in dextrose 5% 250 mL (100 Units/mL) infusion (premix), 0-4,500 Units/hr, intravenous, Continuous, Ananya Ross MD, Last Rate: 13 mL/hr at 02/08/24 0602, 1,300 Units/hr at 02/08/24  0602    insulin lispro (HumaLOG) injection 0-20 Units, 0-20 Units, subcutaneous, Before meals & nightly, Charles Toussaint MD, 4 Units at 02/08/24 0840    melatonin tablet 5 mg, 5 mg, oral, Nightly PRN, Ananya Ross MD    metFORMIN (Glucophage) tablet 1,000 mg, 1,000 mg, oral, BID with meals, Ananya Ross MD, 1,000 mg at 02/08/24 0839    nitroglycerin (Nitrostat) SL tablet 0.4 mg, 0.4 mg, sublingual, q5 min PRN, Ananya Ross MD    pantoprazole (ProtoNix) EC tablet 40 mg, 40 mg, oral, Daily before breakfast, Ananay Ross MD, 40 mg at 02/08/24 0633    polyethylene glycol (Glycolax, Miralax) packet 17 g, 17 g, oral, Daily, Ananya Ross MD, 17 g at 02/07/24 2228    sennosides-docusate sodium (Stefanie-Colace) 8.6-50 mg per tablet 1 tablet, 1 tablet, oral, Daily, Ananya Ross MD, 1 tablet at 02/08/24 0839    tamsulosin (Flomax) 24 hr capsule 0.8 mg, 0.8 mg, oral, Daily, Ananya Ross MD, 0.8 mg at 02/08/24 0839     Past Medical History  He has a past medical history of Other conditions influencing health status (03/03/2022).    Surgical History  He has a past surgical history that includes Other surgical history (02/04/2019); Other surgical history (02/04/2019); Other surgical history (02/04/2019); and Other surgical history (02/04/2019).     Social History  He reports that he has never smoked. He has never used smokeless tobacco. He reports current alcohol use. He reports that he does not use drugs.    Family History  No family history on file.    Allergies  Penicillins    Review of Systems   Constitutional:  Positive for unexpected weight change (loss and regain).   Eyes:  Negative for visual disturbance.   Respiratory:  Positive for shortness of breath.    Cardiovascular:  Positive for chest pain.   Gastrointestinal:  Positive for constipation. Negative for abdominal pain, nausea and vomiting.   Genitourinary:  Positive for difficulty urinating and frequency.        As above   Skin:  Negative  "for rash.   Neurological:  Positive for numbness. Negative for tremors and headaches.         Last Recorded Vitals  Blood pressure 110/76, pulse 90, temperature 36.6 °C (97.9 °F), temperature source Temporal, resp. rate 16, height 1.727 m (5' 8\"), weight 73.4 kg (161 lb 13.1 oz), SpO2 97 %.    Physical Exam  Constitutional:       General: He is not in acute distress.     Appearance: He is normal weight.   HENT:      Head: Normocephalic.      Mouth/Throat:      Mouth: Mucous membranes are moist.   Eyes:      Extraocular Movements: Extraocular movements intact.   Neck:      Thyroid: No thyromegaly.   Cardiovascular:      Pulses:           Radial pulses are 2+ on the right side and 2+ on the left side.        Dorsalis pedis pulses are 2+ on the right side and 2+ on the left side.   Musculoskeletal:      Right lower leg: No edema.      Left lower leg: No edema.      Right foot: Bunion present.      Left foot: Bunion present.   Feet:      Comments: Flat feet  Lymphadenopathy:      Cervical: No cervical adenopathy.   Skin:     Comments: No foot sores   Neurological:      Mental Status: He is alert.      Motor: No tremor.   Psychiatric:         Mood and Affect: Affect normal.          Relevant Results  Lab Results   Component Value Date    POCGLU 160 (H) 02/08/2024    POCGLU 229 (H) 02/07/2024    POCGLU 335 (H) 01/15/2024    POCGLU 419 (H) 01/11/2024    POCGLU 470 (H) 01/11/2024    GLUCOSE 146 (H) 02/08/2024    GLUCOSE 78 02/07/2024    GLUCOSE 376 (H) 01/30/2024    GLUCOSE 443 (H) 01/11/2024    GLUCOSE 152 (H) 05/05/2022      Latest Reference Range & Units 02/07/24 16:26 02/08/24 07:24   GLUCOSE 74 - 99 mg/dL 78 146 (H)   SODIUM 136 - 145 mmol/L 139 139   POTASSIUM 3.5 - 5.3 mmol/L 3.6 3.8   CHLORIDE 98 - 107 mmol/L 103 106   Bicarbonate 21 - 32 mmol/L 25 26   Anion Gap 10 - 20 mmol/L 15 11   Blood Urea Nitrogen 6 - 23 mg/dL 20 15   Creatinine 0.50 - 1.30 mg/dL 0.90 0.85   EGFR >60 mL/min/1.73m*2 >90 >90   Calcium 8.6 - " 10.3 mg/dL 9.1 8.1 (L)   Albumin 3.4 - 5.0 g/dL 3.7    Alkaline Phosphatase 33 - 136 U/L 100    ALT 10 - 52 U/L 14    AST 9 - 39 U/L 14    Bilirubin Total 0.0 - 1.2 mg/dL 0.4       Latest Reference Range & Units 02/07/24 16:26   Thyroxine, Free 0.61 - 1.12 ng/dL 0.72   Thyroid Stimulating Hormone 0.44 - 3.98 mIU/L 5.73 (H)       IMPRESSION  TYPE 2 DIABETES WITH HYPERGLYCEMIA  Chronically poor glucose control  Recent discontinuation of insulin, which patient believes he needs to resume  Given lean body habitus and requirement for insulin within 3 years of diagnosis, I would question the possibility of latent type 1 diabetes    ABNORMAL THYROID FUNCTION TEST  Minimal TSH elevation, may represent subclinical hypothyroidism      RECOMMENDATIONS  Insulin lispro scale for now  Discussed resuming 70/30 insulin vs changing to basal bolus.  His irregular eating schedule favors the latter  Start glargine 10 units subcutaneous tonight  Patient knows how to inject insulin and prefers a vial and syringe  Diabetes education  Draw anti-GAD65 antibody and c-peptide with AM labs tomorrow  No immediate indication for thyroid replacement, particularly in the setting of atrial flutter.       Thomas Yeung MD     7/2021

## 2024-02-09 ENCOUNTER — APPOINTMENT (OUTPATIENT)
Dept: CARDIOLOGY | Facility: HOSPITAL | Age: 74
DRG: 309 | End: 2024-02-09
Payer: MEDICARE

## 2024-02-09 ENCOUNTER — APPOINTMENT (OUTPATIENT)
Dept: UROLOGY | Facility: CLINIC | Age: 74
End: 2024-02-09
Payer: MEDICARE

## 2024-02-09 ENCOUNTER — PHARMACY VISIT (OUTPATIENT)
Dept: PHARMACY | Facility: CLINIC | Age: 74
End: 2024-02-09
Payer: COMMERCIAL

## 2024-02-09 VITALS
RESPIRATION RATE: 19 BRPM | WEIGHT: 161.82 LBS | HEART RATE: 95 BPM | BODY MASS INDEX: 24.52 KG/M2 | HEIGHT: 68 IN | SYSTOLIC BLOOD PRESSURE: 120 MMHG | OXYGEN SATURATION: 94 % | TEMPERATURE: 97.5 F | DIASTOLIC BLOOD PRESSURE: 71 MMHG

## 2024-02-09 DIAGNOSIS — E11.65 TYPE 2 DIABETES MELLITUS WITH HYPERGLYCEMIA, WITHOUT LONG-TERM CURRENT USE OF INSULIN (MULTI): Primary | ICD-10-CM

## 2024-02-09 PROBLEM — E08.00 DIAB D/T UNDRL COND W HYPROSM W/O NONKET HYPRGLY-HYPROS COMA (MULTI): Status: RESOLVED | Noted: 2024-01-10 | Resolved: 2024-02-09

## 2024-02-09 LAB
ATRIAL RATE: 267 BPM
C PEPTIDE SERPL-MCNC: 0.6 NG/ML (ref 0.7–3.9)
GLUCOSE BLD MANUAL STRIP-MCNC: 150 MG/DL (ref 74–99)
HOLD SPECIMEN: NORMAL
P AXIS: 84 DEGREES
P OFFSET: 138 MS
P ONSET: 102 MS
Q ONSET: 208 MS
QRS COUNT: 15 BEATS
QRS DURATION: 106 MS
QT INTERVAL: 382 MS
QTC CALCULATION(BAZETT): 464 MS
QTC FREDERICIA: 435 MS
R AXIS: 41 DEGREES
T AXIS: 26 DEGREES
T OFFSET: 399 MS
VENTRICULAR RATE: 89 BPM

## 2024-02-09 PROCEDURE — RXMED WILLOW AMBULATORY MEDICATION CHARGE

## 2024-02-09 PROCEDURE — 83519 RIA NONANTIBODY: CPT | Performed by: INTERNAL MEDICINE

## 2024-02-09 PROCEDURE — 2500000001 HC RX 250 WO HCPCS SELF ADMINISTERED DRUGS (ALT 637 FOR MEDICARE OP): Performed by: INTERNAL MEDICINE

## 2024-02-09 PROCEDURE — 2500000004 HC RX 250 GENERAL PHARMACY W/ HCPCS (ALT 636 FOR OP/ED): Performed by: INTERNAL MEDICINE

## 2024-02-09 PROCEDURE — 99238 HOSP IP/OBS DSCHRG MGMT 30/<: CPT | Performed by: INTERNAL MEDICINE

## 2024-02-09 PROCEDURE — 36415 COLL VENOUS BLD VENIPUNCTURE: CPT | Performed by: INTERNAL MEDICINE

## 2024-02-09 PROCEDURE — 84681 ASSAY OF C-PEPTIDE: CPT | Mod: GEALAB | Performed by: INTERNAL MEDICINE

## 2024-02-09 PROCEDURE — 93306 TTE W/DOPPLER COMPLETE: CPT

## 2024-02-09 PROCEDURE — 2500000001 HC RX 250 WO HCPCS SELF ADMINISTERED DRUGS (ALT 637 FOR MEDICARE OP): Performed by: NURSE PRACTITIONER

## 2024-02-09 PROCEDURE — 82947 ASSAY GLUCOSE BLOOD QUANT: CPT

## 2024-02-09 RX ORDER — INSULIN GLARGINE-YFGN 100 [IU]/ML
10 INJECTION, SOLUTION SUBCUTANEOUS NIGHTLY
Qty: 9 ML | Refills: 0 | Status: SHIPPED | OUTPATIENT
Start: 2024-02-09 | End: 2024-03-11 | Stop reason: SDUPTHER

## 2024-02-09 RX ORDER — POLYETHYLENE GLYCOL 3350 17 G/17G
17 POWDER, FOR SOLUTION ORAL DAILY
Qty: 238 G | Refills: 0 | Status: SHIPPED | OUTPATIENT
Start: 2024-02-09

## 2024-02-09 RX ORDER — INSULIN GLARGINE 100 [IU]/ML
10 INJECTION, SOLUTION SUBCUTANEOUS NIGHTLY
Qty: 10 ML | Refills: 0 | Status: SHIPPED | OUTPATIENT
Start: 2024-02-09 | End: 2024-02-09 | Stop reason: CLARIF

## 2024-02-09 RX ORDER — LACTULOSE 10 G/15ML
20 SOLUTION ORAL DAILY
Qty: 900 ML | Refills: 0 | Status: SHIPPED | OUTPATIENT
Start: 2024-02-09 | End: 2024-03-11 | Stop reason: ALTCHOICE

## 2024-02-09 RX ORDER — METOPROLOL SUCCINATE 25 MG/1
25 TABLET, EXTENDED RELEASE ORAL DAILY
Qty: 30 TABLET | Refills: 0 | Status: SHIPPED | OUTPATIENT
Start: 2024-02-09 | End: 2024-03-13 | Stop reason: SDUPTHER

## 2024-02-09 RX ORDER — PEN NEEDLE, DIABETIC 30 GX3/16"
NEEDLE, DISPOSABLE MISCELLANEOUS
Qty: 100 EACH | Refills: 0 | Status: SHIPPED | OUTPATIENT
Start: 2024-02-09

## 2024-02-09 RX ORDER — CIPROFLOXACIN 250 MG/1
250 TABLET, FILM COATED ORAL 2 TIMES DAILY
Qty: 14 TABLET | Refills: 0 | Status: SHIPPED | OUTPATIENT
Start: 2024-02-09 | End: 2024-02-16

## 2024-02-09 RX ORDER — ATORVASTATIN CALCIUM 80 MG/1
80 TABLET, FILM COATED ORAL NIGHTLY
Qty: 30 TABLET | Refills: 0 | Status: SHIPPED | OUTPATIENT
Start: 2024-02-09 | End: 2024-03-13

## 2024-02-09 RX ADMIN — TAMSULOSIN HYDROCHLORIDE 0.8 MG: 0.4 CAPSULE ORAL at 09:31

## 2024-02-09 RX ADMIN — ASPIRIN 81 MG CHEWABLE TABLET 81 MG: 81 TABLET CHEWABLE at 09:31

## 2024-02-09 RX ADMIN — DOCUSATE SODIUM 100 MG: 100 CAPSULE, LIQUID FILLED ORAL at 09:00

## 2024-02-09 RX ADMIN — METOPROLOL SUCCINATE 25 MG: 25 TABLET, EXTENDED RELEASE ORAL at 09:31

## 2024-02-09 RX ADMIN — PANTOPRAZOLE SODIUM 40 MG: 40 TABLET, DELAYED RELEASE ORAL at 06:02

## 2024-02-09 RX ADMIN — GABAPENTIN 100 MG: 100 CAPSULE ORAL at 09:31

## 2024-02-09 RX ADMIN — SENNOSIDES AND DOCUSATE SODIUM 1 TABLET: 50; 8.6 TABLET ORAL at 09:31

## 2024-02-09 ASSESSMENT — COGNITIVE AND FUNCTIONAL STATUS - GENERAL
DAILY ACTIVITIY SCORE: 24
MOBILITY SCORE: 24

## 2024-02-09 ASSESSMENT — ACTIVITIES OF DAILY LIVING (ADL): LACK_OF_TRANSPORTATION: NO

## 2024-02-09 NOTE — DISCHARGE SUMMARY
Discharge Diagnosis  Atrial flutter (CMS/[facility])    Issues Requiring Follow-Up  Cardioversion, new constipation issues, diabetes    Discharge Meds     Your medication list        START taking these medications        Instructions Last Dose Given Next Dose Due   apixaban 5 mg tablet  Commonly known as: Eliquis      Take 1 tablet (5 mg) by mouth every 12 hours. Do not start before February 9, 2024.       atorvastatin 80 mg tablet  Commonly known as: Lipitor      Take 1 tablet (80 mg) by mouth once daily at bedtime.       ciprofloxacin 250 mg tablet  Commonly known as: Cipro      Take 1 tablet (250 mg) by mouth 2 times a day for 7 days.       insulin glargine 100 unit/mL injection  Commonly known as: Lantus      Inject 10 Units under the skin once daily at bedtime. Take as directed per insulin instructions.       lactulose 20 gram/30 mL oral solution      Take 30 mL (20 g) by mouth once daily.       metoprolol succinate XL 25 mg 24 hr tablet  Commonly known as: Toprol-XL      Take 1 tablet (25 mg) by mouth once daily. Do not crush or chew.       polyethylene glycol 17 gram packet  Commonly known as: Glycolax, Miralax      Take 17 g by mouth once daily.              CHANGE how you take these medications        Instructions Last Dose Given Next Dose Due   gabapentin 100 mg capsule  Commonly known as: Neurontin  What changed: additional instructions      Take 1 capsule (100 mg) by mouth 3 times a day.       tamsulosin 0.4 mg 24 hr capsule  Commonly known as: Flomax  What changed: when to take this      Take 2 capsules (0.8 mg) by mouth once daily.              CONTINUE taking these medications        Instructions Last Dose Given Next Dose Due   aspirin 81 mg chewable tablet      Chew 1 tablet (81 mg) once daily.       docusate sodium 100 mg capsule  Commonly known as: Dulcolax Stool Softener (dss)      Take 1 capsule (100 mg) by mouth 3 times a day as needed for constipation.       lubiprostone 24 mcg capsule  Commonly known  as: Amitiza      Take 1 capsule (24 mcg) by mouth 2 times a day with meals. Take with meals       omeprazole 20 mg DR capsule  Commonly known as: PriLOSEC      Take 1 capsule (20 mg) by mouth once daily. Do not crush or chew.       sennosides-docusate sodium 8.6-50 mg tablet  Commonly known as: Stefanie-Colace      Take 1 tablet by mouth once daily.              STOP taking these medications      clotrimazole 1 % external solution  Commonly known as: Lotrimin        empagliflozin 25 mg  Commonly known as: Jardiance        linaCLOtide 72 mcg capsule  Commonly known as: Linzess        metFORMIN 500 mg tablet  Commonly known as: Glucophage        Trulance tablet tablet  Generic drug: plecanatide                  Where to Get Your Medications        These medications were sent to [facility]  [id number], [facility] [address]      Hours: 9 AM to 5 PM Mon-Fri Phone: [phone]   apixaban 5 mg tablet  atorvastatin 80 mg tablet  ciprofloxacin 250 mg tablet  insulin glargine 100 unit/mL injection  lactulose 20 gram/30 mL oral solution  metoprolol succinate XL 25 mg 24 hr tablet  polyethylene glycol 17 gram packet         Test Results Pending At Discharge  Pending Labs       Order Current Status    Urine Culture In process            Hospital Course   Mr Rebolledo presented in atrial flutter, with controlled ventricular rate, new diagnosis. He also had multiple other complaints which included blood sugars running 300-400 at home, having severe constipation issues for last couple weeks, and difficulty  urinating. Yaneth/DR Rust saw patient, we started him on eliquis and metop. Rate is still well controlled. Echo is pending. Added lactulose and miralax to his bowel regimen, but he will need to follow up with gi regarding this change in bowel habits. He also has a uti-starting on cipro due to pcn allergy. He has appt with urology later this morning. Dr Yeung saw in consult as well, he started him back on a low  dose of lantus and sugars are already much improved. Stopping oral hypoglycemics. He also has been started on lipitor for his significant hyperlipidemia.     Pertinent Physical Exam At Time of Discharge  Physical Exam  stable  Outpatient Follow-Up  Future Appointments   Date Time Provider Department Center   3/14/2024  9:00 AM Jovanny Suggs MD [id number] [facility]   3/27/2024  9:20 AM Paul Beckman MD [id number] [facility]    Follow up with Edwige naranjo --1-2 weeks      Ananya Ross MD

## 2024-02-09 NOTE — PROGRESS NOTES
02/09/24 0954   Discharge Planning   Living Arrangements Spouse/significant other   Support Systems Spouse/significant other   Assistance Needed Patient is A&Ox3, on room air at baseline, is independent with ADL's and uses no DME at home, drives. Patient denies further needs upon discharge   Type of Residence Private residence   Who is requesting discharge planning? Provider   Home or Post Acute Services None   Patient expects to be discharged to: Home no needs   Does the patient need discharge transport arranged? No   RoundTrip coordination needed? No   Has discharge transport been arranged? No   Financial Resource Strain   How hard is it for you to pay for the very basics like food, housing, medical care, and heating? Not hard   Housing Stability   In the last 12 months, was there a time when you were not able to pay the mortgage or rent on time? N   In the last 12 months, how many places have you lived? 1   In the last 12 months, was there a time when you did not have a steady place to sleep or slept in a shelter (including now)? N   Transportation Needs   In the past 12 months, has lack of transportation kept you from medical appointments or from getting medications? no   In the past 12 months, has lack of transportation kept you from meetings, work, or from getting things needed for daily living? No     2/9/24 at 0954: Patient medically ready for discharge. Spoke with patient at bedside, they continue to deny any home going needs and wife will provide transport when discharged.

## 2024-02-10 LAB
AORTIC VALVE MEAN GRADIENT: 2 MMHG
AORTIC VALVE PEAK VELOCITY: 0.94 M/S
AV PEAK GRADIENT: 3.6 MMHG
AVA (PEAK VEL): 2.63 CM2
AVA (VTI): 2.64 CM2
EJECTION FRACTION APICAL 4 CHAMBER: 53.6
EJECTION FRACTION: 50 %
LEFT ATRIUM VOLUME AREA LENGTH INDEX BSA: 19.1 ML/M2
LEFT VENTRICLE INTERNAL DIMENSION DIASTOLE: 4.27 CM (ref 3.5–6)
LEFT VENTRICULAR OUTFLOW TRACT DIAMETER: 2 CM
MITRAL VALVE E/E' RATIO: 7.4
RIGHT VENTRICLE FREE WALL PEAK S': 7.02 CM/S
RIGHT VENTRICLE PEAK SYSTOLIC PRESSURE: 22.2 MMHG
TRICUSPID ANNULAR PLANE SYSTOLIC EXCURSION: 2 CM

## 2024-02-11 LAB — BACTERIA UR CULT: ABNORMAL

## 2024-02-12 ENCOUNTER — PATIENT OUTREACH (OUTPATIENT)
Dept: PRIMARY CARE | Facility: CLINIC | Age: 74
End: 2024-02-12
Payer: MEDICARE

## 2024-02-12 ENCOUNTER — DOCUMENTATION (OUTPATIENT)
Dept: PRIMARY CARE | Facility: CLINIC | Age: 74
End: 2024-02-12
Payer: MEDICARE

## 2024-02-12 LAB — GAD65 AB SER IA-ACNC: <5 IU/ML (ref 0–5)

## 2024-02-12 NOTE — SIGNIFICANT EVENT
Follow Up Phone Call    Outgoing phone call    Spoke to: Jay Rebolledo Relationship:self   Phone number: [phone]      Outcome: I left a message on answering machine   Chief Complaint   Patient presents with    Abnormal ECG          Diagnosis:Not applicable      \

## 2024-02-12 NOTE — PROGRESS NOTES
Discharge facility: [facility]  Discharge diagnosis: Atrial flutter   Issues Requiring Follow-Up  Cardioversion, new constipation issues, diabetes  Admission date: 2-7-24  Discharge date: 2-9-24    PCP Appointment Date: no available appts to schedule within 14 days, message sent to office  Specialist Appointment Date: 2-15-24 NP cardiology, 3-14-24, GI  Hospital Encounter and Summary: Linked    See Discharge assessment below for further details    Engagement  Call Start Time: 1424 (2/12/2024  2:33 PM)    Medications  Medications reviewed with patient/caregiver?: Yes (2/12/2024  2:33 PM)  Is the patient having any side effects they believe may be caused by any medication additions or changes?: No (2/12/2024  2:33 PM)  Does the patient have all medications ordered at discharge?: Yes (2/12/2024  2:33 PM)  Care Management Interventions: Provided patient education (2/12/2024  2:33 PM)  Is the patient taking all medications as directed (includes completed medication regime)?: Yes (2/12/2024  2:33 PM)  Care Management Interventions: Provided patient education (2/12/2024  2:33 PM)  Medication Comments: Instructed on new/changed and discontinued medications of the following START taking:  apixaban (Eliquis)  atorvastatin (Lipitor)  ciprofloxacin (Cipro)  insulin glargine (Lantus)  lactulose  metoprolol succinate XL (Toprol-XL)  polyethylene glycol (Glycolax, Miralax)       CHANGE how you take:  gabapentin (Neurontin)                             STOP taking:  clotrimazole 1 % external solution (Lotrimin)  empagliflozin 25 mg (Jardiance)  linaCLOtide 72 mcg capsule (Linzess)  metFORMIN 500 mg tablet (Glucophage)  Trulance tablet tablet (plecanatide) (2/12/2024  2:33 PM)  Follow Up Tasks: -- (n/a) (2/12/2024  2:33 PM)    Appointments  Does the patient have a primary care provider?: Yes (2/12/2024  2:33 PM)  Care Management Interventions: Advised patient to make appointment; Educated patient on importance of making  appointment (2/12/2024  2:33 PM)  Has the patient kept scheduled appointments due by today?: Yes (2/12/2024  2:33 PM)  Care Management Interventions: Educated on importance of keeping appointment (2/12/2024  2:33 PM)  Follow Up Tasks: -- (DM educator referral?) (2/12/2024  2:33 PM)    Self Management  What is the home health agency?: n/a (2/12/2024  2:33 PM)  Has home health visited the patient within 72 hours of discharge?: Not applicable (2/12/2024  2:33 PM)  Home Health Interventions: -- (n/a) (2/12/2024  2:33 PM)  What Durable Medical Equipment (DME) was ordered?: n/a (2/12/2024  2:33 PM)  Has all Durable Medical Equipment (DME) been delivered?: -- (n/a) (2/12/2024  2:33 PM)  DME Interventions: -- (n/a) (2/12/2024  2:33 PM)  Care Management Interventions: Notified PCP/provider (2/12/2024  2:33 PM)  Follow Up Tasks: -- (n/a) (2/12/2024  2:33 PM)    Patient Teaching  Does the patient have access to their discharge instructions?: Yes (2/12/2024  2:33 PM)  Care Management Interventions: Reviewed instructions with patient (2/12/2024  2:33 PM)  What is the patient's perception of their health status since discharge?: Improving (2/12/2024  2:33 PM)  Is the patient/caregiver able to teach back the hierarchy of who to call/visit for symptoms/problems? PCP, Specialist, Home Health nurse, Urgent Care, ED, 911: Yes (2/12/2024  2:33 PM)  Patient/Caregiver Education Comments: Instructed on discharge instructions from hospital. Instructed to continue to monitor blood sugars ans write readings down. He does not currently have blood pressure monitor at home.  Instructed if increased shortness of breath or chest pain to call 911. Provided my contact information and encouraged to call with any questions. (2/12/2024  2:33 PM)    Wrap Up  Is the patient/caregiver familiar with Advance Care Planning?: Yes (2/12/2024  2:33 PM)  Would the patient like more information on Advance Care Planning?: No (2/12/2024  2:33 PM)  Wrap Up  Additional Comments: Encouraged patient to call and get appt with PCP ASAP. He reports he does not think he was gettingthe insulin because when he pulled the needle out, he could see the insulin dripping. Encouraged him to count to 20 after he presses the button to assure all insulin is injected. He reports his blood sugar was 400. Encouraged him to continue ot monitor and write down readings. He reports he also ate a piece of cake. Encouraged him to stick with the 10 units until follow up with Dr as frequenctly doses are started off at a lower amount to prevent from blood sugars dropping too low and they adjust later if needed. Patient verbalized understanding. (2/12/2024  2:33 PM)

## 2024-02-16 DIAGNOSIS — E13.69 OTHER SPECIFIED DIABETES MELLITUS WITH OTHER SPECIFIED COMPLICATION, UNSPECIFIED WHETHER LONG TERM INSULIN USE (MULTI): Primary | ICD-10-CM

## 2024-02-20 ENCOUNTER — PATIENT OUTREACH (OUTPATIENT)
Dept: PRIMARY CARE | Facility: CLINIC | Age: 74
End: 2024-02-20
Payer: MEDICARE

## 2024-02-20 NOTE — PROGRESS NOTES
Unable to reach patient for call back .  LVM with call back number for patient to call if needed

## 2024-03-11 ENCOUNTER — OFFICE VISIT (OUTPATIENT)
Dept: PRIMARY CARE | Facility: CLINIC | Age: 74
End: 2024-03-11
Payer: MEDICARE

## 2024-03-11 VITALS
WEIGHT: 166 LBS | HEART RATE: 55 BPM | BODY MASS INDEX: 25.24 KG/M2 | DIASTOLIC BLOOD PRESSURE: 73 MMHG | SYSTOLIC BLOOD PRESSURE: 120 MMHG | OXYGEN SATURATION: 96 %

## 2024-03-11 DIAGNOSIS — R60.0 BILATERAL LEG EDEMA: Primary | ICD-10-CM

## 2024-03-11 DIAGNOSIS — E11.65 TYPE 2 DIABETES MELLITUS WITH HYPERGLYCEMIA, WITHOUT LONG-TERM CURRENT USE OF INSULIN (MULTI): ICD-10-CM

## 2024-03-11 PROCEDURE — 3050F LDL-C >= 130 MG/DL: CPT | Performed by: INTERNAL MEDICINE

## 2024-03-11 PROCEDURE — 1036F TOBACCO NON-USER: CPT | Performed by: INTERNAL MEDICINE

## 2024-03-11 PROCEDURE — 3074F SYST BP LT 130 MM HG: CPT | Performed by: INTERNAL MEDICINE

## 2024-03-11 PROCEDURE — 99214 OFFICE O/P EST MOD 30 MIN: CPT | Performed by: INTERNAL MEDICINE

## 2024-03-11 PROCEDURE — 1160F RVW MEDS BY RX/DR IN RCRD: CPT | Performed by: INTERNAL MEDICINE

## 2024-03-11 PROCEDURE — 3078F DIAST BP <80 MM HG: CPT | Performed by: INTERNAL MEDICINE

## 2024-03-11 PROCEDURE — 1159F MED LIST DOCD IN RCRD: CPT | Performed by: INTERNAL MEDICINE

## 2024-03-11 PROCEDURE — 3046F HEMOGLOBIN A1C LEVEL >9.0%: CPT | Performed by: INTERNAL MEDICINE

## 2024-03-11 RX ORDER — AMIODARONE HYDROCHLORIDE 200 MG/1
200 TABLET ORAL 2 TIMES DAILY
COMMUNITY
Start: 2024-02-21 | End: 2024-03-13 | Stop reason: SDUPTHER

## 2024-03-11 RX ORDER — INSULIN GLARGINE-YFGN 100 [IU]/ML
20 INJECTION, SOLUTION SUBCUTANEOUS NIGHTLY
Qty: 18 ML | Refills: 0 | Status: SHIPPED | OUTPATIENT
Start: 2024-03-11 | End: 2024-03-18 | Stop reason: SDUPTHER

## 2024-03-11 NOTE — PATIENT INSTRUCTIONS
Compression stockings and leg elevation     Fiber, miralax and dulcolax/senna     Stop 70./30 and increase lantus to 20 units at bedtime     Come back in 1 week

## 2024-03-11 NOTE — PROGRESS NOTES
Subjective   Patient ID: Jay Rebolledo is a 74 y.o. male who presents for Edema (X 4-5 days ).    HPI     Recently admitted to [facility] for Aflutter. Started on metoprolol , amiodarone and Eliquis. Now reports b/l LE edema L>R x 4 days. No SOB or orthopnea. No skin skin changes     Reports taking 70/30 and Lantus (was started on Lantus while in patient was suppose to stop 70/30)      Review of Systems   All other systems reviewed and are negative.      Objective   There were no vitals taken for this visit.    Physical Exam  Constitutional:       Appearance: Normal appearance.   HENT:      Head: Normocephalic and atraumatic.   Cardiovascular:      Rate and Rhythm: Normal rate and regular rhythm.      Heart sounds: Normal heart sounds. No murmur heard.     No gallop.   Pulmonary:      Effort: Pulmonary effort is normal. No respiratory distress.      Breath sounds: No wheezing or rales.   Musculoskeletal:      Comments: Trace LE edema    Skin:     General: Skin is warm and dry.      Findings: No rash.   Neurological:      Mental Status: He is alert and oriented to person, place, and time. Mental status is at baseline.   Psychiatric:         Mood and Affect: Mood normal.         Behavior: Behavior normal.         Assessment/Plan       #b/l LE edema   -Edema trace b/l. No other cardiac symptoms. Recommended compression stockings and leg elevation     #DM2  -Reminded patient to stop taking 70/30 and increase Lantus to 20 units daily     RTC 1 week

## 2024-03-13 ENCOUNTER — ANESTHESIA EVENT (OUTPATIENT)
Dept: CARDIOLOGY | Facility: HOSPITAL | Age: 74
End: 2024-03-13
Payer: MEDICARE

## 2024-03-13 ENCOUNTER — ANESTHESIA (OUTPATIENT)
Dept: CARDIOLOGY | Facility: HOSPITAL | Age: 74
End: 2024-03-13
Payer: MEDICARE

## 2024-03-13 ENCOUNTER — HOSPITAL ENCOUNTER (OUTPATIENT)
Dept: CARDIOLOGY | Facility: HOSPITAL | Age: 74
Setting detail: OUTPATIENT SURGERY
Discharge: HOME | End: 2024-03-13
Payer: MEDICARE

## 2024-03-13 VITALS
HEART RATE: 54 BPM | SYSTOLIC BLOOD PRESSURE: 105 MMHG | DIASTOLIC BLOOD PRESSURE: 64 MMHG | BODY MASS INDEX: 25.16 KG/M2 | RESPIRATION RATE: 15 BRPM | HEIGHT: 68 IN | OXYGEN SATURATION: 96 % | WEIGHT: 166.01 LBS

## 2024-03-13 DIAGNOSIS — I48.0 PAF (PAROXYSMAL ATRIAL FIBRILLATION) (MULTI): ICD-10-CM

## 2024-03-13 DIAGNOSIS — I48.3 TYPICAL ATRIAL FLUTTER (MULTI): Primary | ICD-10-CM

## 2024-03-13 DIAGNOSIS — I48.92 ATRIAL FLUTTER BY ELECTROCARDIOGRAM (MULTI): ICD-10-CM

## 2024-03-13 LAB — BODY SURFACE AREA: 1.9 M2

## 2024-03-13 PROCEDURE — 3700000002 HC GENERAL ANESTHESIA TIME - EACH INCREMENTAL 1 MINUTE

## 2024-03-13 PROCEDURE — 7100000010 HC PHASE TWO TIME - EACH INCREMENTAL 1 MINUTE

## 2024-03-13 PROCEDURE — 93005 ELECTROCARDIOGRAM TRACING: CPT

## 2024-03-13 PROCEDURE — 2500000004 HC RX 250 GENERAL PHARMACY W/ HCPCS (ALT 636 FOR OP/ED): Performed by: NURSE ANESTHETIST, CERTIFIED REGISTERED

## 2024-03-13 PROCEDURE — 7100000009 HC PHASE TWO TIME - INITIAL BASE CHARGE

## 2024-03-13 PROCEDURE — A92960 PR CARDIOVERSION, ELECTIVE;EXTERN: Performed by: NURSE ANESTHETIST, CERTIFIED REGISTERED

## 2024-03-13 PROCEDURE — 93010 ELECTROCARDIOGRAM REPORT: CPT | Performed by: INTERNAL MEDICINE

## 2024-03-13 PROCEDURE — 3700000001 HC GENERAL ANESTHESIA TIME - INITIAL BASE CHARGE

## 2024-03-13 PROCEDURE — 92960 CARDIOVERSION ELECTRIC EXT: CPT | Mod: 59

## 2024-03-13 RX ORDER — FENTANYL CITRATE 50 UG/ML
INJECTION, SOLUTION INTRAMUSCULAR; INTRAVENOUS AS NEEDED
Status: DISCONTINUED | OUTPATIENT
Start: 2024-03-13 | End: 2024-03-13

## 2024-03-13 RX ORDER — METOPROLOL SUCCINATE 25 MG/1
12.5 TABLET, EXTENDED RELEASE ORAL NIGHTLY
Qty: 90 TABLET | Refills: 1 | Status: SHIPPED | OUTPATIENT
Start: 2024-03-13

## 2024-03-13 RX ORDER — PROPOFOL 10 MG/ML
INJECTION, EMULSION INTRAVENOUS AS NEEDED
Status: DISCONTINUED | OUTPATIENT
Start: 2024-03-13 | End: 2024-03-13

## 2024-03-13 RX ORDER — AMIODARONE HYDROCHLORIDE 200 MG/1
200 TABLET ORAL DAILY
Qty: 90 TABLET | Refills: 1 | Status: SHIPPED | OUTPATIENT
Start: 2024-03-13

## 2024-03-13 RX ADMIN — PROPOFOL 30 MG: 10 INJECTION, EMULSION INTRAVENOUS at 13:31

## 2024-03-13 RX ADMIN — FENTANYL CITRATE 100 MCG: 50 INJECTION, SOLUTION INTRAMUSCULAR; INTRAVENOUS at 13:20

## 2024-03-13 RX ADMIN — PROPOFOL 50 MG: 10 INJECTION, EMULSION INTRAVENOUS at 13:24

## 2024-03-13 SDOH — HEALTH STABILITY: MENTAL HEALTH: CURRENT SMOKER: 0

## 2024-03-13 ASSESSMENT — PAIN SCALES - GENERAL
PAINLEVEL_OUTOF10: 0 - NO PAIN
PAIN_LEVEL: 3
PAINLEVEL_OUTOF10: 0 - NO PAIN

## 2024-03-13 ASSESSMENT — PAIN - FUNCTIONAL ASSESSMENT
PAIN_FUNCTIONAL_ASSESSMENT: 0-10

## 2024-03-13 NOTE — ANESTHESIA PREPROCEDURE EVALUATION
Patient: Jay Rebolledo    Procedure Information       Date/Time: 03/13/24 1330    Scheduled providers: Donovan Nobles MD    Procedure: CARDIOVERSION EXTERNAL    Location: [facility]            Relevant Problems   Anesthesia (within normal limits)      Cardiovascular   (+) Atrial flutter (CMS/HCC)   (+) Atrial flutter by electrocardiogram (CMS/HCC)   (+) CAD (coronary artery disease)   (+) Chest pain   (+) Peripheral vascular disease, unspecified (CMS/HCC)      Endocrine (within normal limits)      GI   (+) Gastroesophageal reflux disease without esophagitis      /Renal (within normal limits)      Neuro/Psych   (+) Peripheral neuropathy      Pulmonary   (+) CAYLA (obstructive sleep apnea)      GI/Hepatic (within normal limits)      Hematology (within normal limits)      Musculoskeletal (within normal limits)      Eyes, Ears, Nose, and Throat (within normal limits)      Infectious Disease (within normal limits)       Clinical information reviewed:                   NPO Detail:  No data recorded     Physical Exam    Airway  Mallampati: II  TM distance: >3 FB     Cardiovascular   Rhythm: regular  Rate: normal     Dental - normal exam  (+) upper dentures     Pulmonary   Breath sounds clear to auscultation     Abdominal            Anesthesia Plan    History of general anesthesia?: no  History of complications of general anesthesia?: no    ASA 3     MAC     The patient is not a current smoker.  Patient was not previously instructed to abstain from smoking on day of procedure.  Patient did not smoke on day of procedure.    Anesthetic plan and risks discussed with patient.  Use of blood products discussed with patient who.    Plan discussed with CRNA.

## 2024-03-13 NOTE — ANESTHESIA POSTPROCEDURE EVALUATION
Patient: Jay Rebolledo    Procedure Summary       Date: 03/13/24 Room / Location: [facility]    Anesthesia Start: 1317 Anesthesia Stop: 1341    Procedures:       CARDIOVERSION EXTERNAL      ECG 12-LEAD Diagnosis:       PAF (paroxysmal atrial fibrillation) (CMS/HCC)      (post cvn)    Scheduled Providers: Donovan Nobles MD Responsible Provider: JAY Farias    Anesthesia Type: MAC ASA Status: 3            Anesthesia Type: MAC    Vitals Value Taken Time   /79 03/13/24 1336   Temp  03/13/24 1341   Pulse 54 03/13/24 1336   Resp 20 03/13/24 1336   SpO2 98 % 03/13/24 1336       Anesthesia Post Evaluation    Patient location during evaluation: PACU  Patient participation: complete - patient participated  Level of consciousness: awake and alert  Pain score: 3  Pain management: adequate  Airway patency: patent  Cardiovascular status: acceptable  Respiratory status: acceptable  Hydration status: acceptable  Postoperative Nausea and Vomiting: none        There were no known notable events for this encounter.

## 2024-03-13 NOTE — POST-PROCEDURE NOTE
Physician Transition of Care Summary  Invasive Cardiovascular Lab    Procedure Date: 3/13/2024  Attending: Giuliano  Resident/Fellow/Other Assistant: * No surgeons found in log *    Indications:   PAF    Post-procedure diagnosis:   SR    Procedure(s):    J bipasic dync'ed      Procedure Findings:   SR    Description of the Procedure: as above    Complications:   None    Stents/Implants: None      Anticoagulation/Antiplatelet Plan:   NOAC    Estimated Blood Loss:   * No values recorded between 3/13/2024  1:31 PM and 3/13/2024  1:35 PM *    Anesthesia: * No anesthesia type entered * Anesthesia Staff: CRNA: JAY Farias    Any Specimen(s) Removed:   No specimens collected during this procedure.    Disposition:   Home      Electronically signed by: Donovan Nobles MD, 3/13/2024 1:35 PM

## 2024-03-15 ENCOUNTER — PATIENT OUTREACH (OUTPATIENT)
Dept: PRIMARY CARE | Facility: CLINIC | Age: 74
End: 2024-03-15
Payer: MEDICARE

## 2024-03-15 NOTE — PROGRESS NOTES
Call placed regarding one month post discharge follow up call. At time of outreach call the patient feels as if their condition has improved.  He reports since having cardioversion he has been feeling good. Pt denies questions or concerns at this time. Pt aware of my availability for non-emergent concerns. Contact info provided to patient

## 2024-03-18 ENCOUNTER — OFFICE VISIT (OUTPATIENT)
Dept: PRIMARY CARE | Facility: CLINIC | Age: 74
End: 2024-03-18
Payer: MEDICARE

## 2024-03-18 VITALS
OXYGEN SATURATION: 94 % | BODY MASS INDEX: 24.94 KG/M2 | WEIGHT: 164 LBS | HEART RATE: 61 BPM | DIASTOLIC BLOOD PRESSURE: 65 MMHG | SYSTOLIC BLOOD PRESSURE: 116 MMHG

## 2024-03-18 DIAGNOSIS — E11.65 TYPE 2 DIABETES MELLITUS WITH HYPERGLYCEMIA, WITHOUT LONG-TERM CURRENT USE OF INSULIN (MULTI): ICD-10-CM

## 2024-03-18 PROCEDURE — 3046F HEMOGLOBIN A1C LEVEL >9.0%: CPT | Performed by: INTERNAL MEDICINE

## 2024-03-18 PROCEDURE — 99213 OFFICE O/P EST LOW 20 MIN: CPT | Performed by: INTERNAL MEDICINE

## 2024-03-18 PROCEDURE — 3050F LDL-C >= 130 MG/DL: CPT | Performed by: INTERNAL MEDICINE

## 2024-03-18 PROCEDURE — 1160F RVW MEDS BY RX/DR IN RCRD: CPT | Performed by: INTERNAL MEDICINE

## 2024-03-18 PROCEDURE — 3074F SYST BP LT 130 MM HG: CPT | Performed by: INTERNAL MEDICINE

## 2024-03-18 PROCEDURE — 3078F DIAST BP <80 MM HG: CPT | Performed by: INTERNAL MEDICINE

## 2024-03-18 PROCEDURE — 1036F TOBACCO NON-USER: CPT | Performed by: INTERNAL MEDICINE

## 2024-03-18 PROCEDURE — 1159F MED LIST DOCD IN RCRD: CPT | Performed by: INTERNAL MEDICINE

## 2024-03-18 RX ORDER — INSULIN GLARGINE-YFGN 100 [IU]/ML
25 INJECTION, SOLUTION SUBCUTANEOUS NIGHTLY
Qty: 30 ML | Refills: 1 | Status: SHIPPED | OUTPATIENT
Start: 2024-03-18 | End: 2024-04-03 | Stop reason: SDUPTHER

## 2024-03-18 RX ORDER — BLOOD-GLUCOSE SENSOR
EACH MISCELLANEOUS
Qty: 5 EACH | Refills: 5 | Status: SHIPPED | OUTPATIENT
Start: 2024-03-18

## 2024-03-18 RX ORDER — BLOOD-GLUCOSE,RECEIVER,CONT
EACH MISCELLANEOUS
Qty: 1 EACH | Refills: 0 | Status: SHIPPED | OUTPATIENT
Start: 2024-03-18

## 2024-03-18 NOTE — PROGRESS NOTES
Subjective   Patient ID: Jay Rebolledo is a 74 y.o. male who presents for Follow-up (1 week follow up).    HPI     AM sugars 160-200, does report compliance with insulin and improved diet     S/p DCCV for aFib . NO current cardiac symptoms     Review of Systems   All other systems reviewed and are negative.      Objective   There were no vitals taken for this visit.    Physical Exam  Vitals reviewed.   Constitutional:       Appearance: Normal appearance.   HENT:      Head: Normocephalic and atraumatic.   Cardiovascular:      Rate and Rhythm: Normal rate and regular rhythm.      Heart sounds: Normal heart sounds. No murmur heard.     No gallop.   Pulmonary:      Effort: Pulmonary effort is normal. No respiratory distress.      Breath sounds: No wheezing or rales.   Skin:     General: Skin is warm and dry.      Findings: No rash.   Neurological:      Mental Status: He is alert and oriented to person, place, and time. Mental status is at baseline.   Psychiatric:         Mood and Affect: Mood normal.         Behavior: Behavior normal.         Assessment/Plan       #DM2  -Sugars are improved but still high   -Increase Tabby-glee to 25 units daily   -Order CGM  -Declines DM education

## 2024-03-18 NOTE — PATIENT INSTRUCTIONS
Increase insulin to 25 units daily   Use new glucose montior     Goal am sugar 80o-130  2 hrs after meal less than 180    2 weeks

## 2024-03-27 LAB
ATRIAL RATE: 49 BPM
P AXIS: 4 DEGREES
P OFFSET: 141 MS
P ONSET: 78 MS
PR INTERVAL: 256 MS
Q ONSET: 206 MS
QRS COUNT: 8 BEATS
QRS DURATION: 110 MS
QT INTERVAL: 534 MS
QTC CALCULATION(BAZETT): 482 MS
QTC FREDERICIA: 499 MS
R AXIS: 45 DEGREES
T AXIS: -3 DEGREES
T OFFSET: 473 MS
VENTRICULAR RATE: 49 BPM

## 2024-04-02 NOTE — PROGRESS NOTES
Subjective   Patient ID: Jay Rebolledo is a 74 y.o. male who presents for Follow-up (2 week follow up).    HPI     Am sugars 160-180, one reading of 79  Reports compliance with insulin     Review of Systems   All other systems reviewed and are negative.      Objective   There were no vitals taken for this visit.    Physical Exam  Constitutional:       Appearance: Normal appearance.   Pulmonary:      Effort: Pulmonary effort is normal.   Neurological:      Mental Status: He is alert.   Psychiatric:         Mood and Affect: Mood normal.         Behavior: Behavior normal.         Thought Content: Thought content normal.         Assessment/Plan       #DM2  -Sugars are improved but still high   -Increase Semglee to 32 units daily

## 2024-04-03 ENCOUNTER — OFFICE VISIT (OUTPATIENT)
Dept: PRIMARY CARE | Facility: CLINIC | Age: 74
End: 2024-04-03
Payer: MEDICARE

## 2024-04-03 VITALS
OXYGEN SATURATION: 95 % | DIASTOLIC BLOOD PRESSURE: 73 MMHG | SYSTOLIC BLOOD PRESSURE: 119 MMHG | WEIGHT: 164 LBS | HEART RATE: 62 BPM | BODY MASS INDEX: 24.94 KG/M2

## 2024-04-03 DIAGNOSIS — E11.65 TYPE 2 DIABETES MELLITUS WITH HYPERGLYCEMIA, WITHOUT LONG-TERM CURRENT USE OF INSULIN (MULTI): ICD-10-CM

## 2024-04-03 PROCEDURE — 3050F LDL-C >= 130 MG/DL: CPT | Performed by: INTERNAL MEDICINE

## 2024-04-03 PROCEDURE — 3074F SYST BP LT 130 MM HG: CPT | Performed by: INTERNAL MEDICINE

## 2024-04-03 PROCEDURE — 3046F HEMOGLOBIN A1C LEVEL >9.0%: CPT | Performed by: INTERNAL MEDICINE

## 2024-04-03 PROCEDURE — 1160F RVW MEDS BY RX/DR IN RCRD: CPT | Performed by: INTERNAL MEDICINE

## 2024-04-03 PROCEDURE — 3078F DIAST BP <80 MM HG: CPT | Performed by: INTERNAL MEDICINE

## 2024-04-03 PROCEDURE — 99213 OFFICE O/P EST LOW 20 MIN: CPT | Performed by: INTERNAL MEDICINE

## 2024-04-03 PROCEDURE — 1159F MED LIST DOCD IN RCRD: CPT | Performed by: INTERNAL MEDICINE

## 2024-04-03 RX ORDER — INSULIN GLARGINE-YFGN 100 [IU]/ML
32 INJECTION, SOLUTION SUBCUTANEOUS NIGHTLY
Qty: 30 ML | Refills: 1 | Status: SHIPPED | OUTPATIENT
Start: 2024-04-03

## 2024-04-10 DIAGNOSIS — G63 POLYNEUROPATHY ASSOCIATED WITH UNDERLYING DISEASE (MULTI): ICD-10-CM

## 2024-04-10 NOTE — TELEPHONE ENCOUNTER
Requested Prescriptions     Pending Prescriptions Disp Refills    gabapentin (Neurontin) 100 mg capsule 90 capsule 5     Sig: Take 1 capsule (100 mg) by mouth 3 times a day.

## 2024-04-11 RX ORDER — GABAPENTIN 100 MG/1
100 CAPSULE ORAL 3 TIMES DAILY
Qty: 90 CAPSULE | Refills: 5 | Status: SHIPPED | OUTPATIENT
Start: 2024-04-11

## 2024-05-01 ENCOUNTER — OFFICE VISIT (OUTPATIENT)
Dept: PRIMARY CARE | Facility: CLINIC | Age: 74
End: 2024-05-01
Payer: MEDICARE

## 2024-05-01 VITALS
HEART RATE: 61 BPM | DIASTOLIC BLOOD PRESSURE: 68 MMHG | BODY MASS INDEX: 25.09 KG/M2 | WEIGHT: 165 LBS | SYSTOLIC BLOOD PRESSURE: 99 MMHG | OXYGEN SATURATION: 95 %

## 2024-05-01 DIAGNOSIS — E11.65 TYPE 2 DIABETES MELLITUS WITH HYPERGLYCEMIA, WITHOUT LONG-TERM CURRENT USE OF INSULIN (MULTI): Primary | ICD-10-CM

## 2024-05-01 PROCEDURE — 1159F MED LIST DOCD IN RCRD: CPT | Performed by: INTERNAL MEDICINE

## 2024-05-01 PROCEDURE — 1160F RVW MEDS BY RX/DR IN RCRD: CPT | Performed by: INTERNAL MEDICINE

## 2024-05-01 PROCEDURE — 3050F LDL-C >= 130 MG/DL: CPT | Performed by: INTERNAL MEDICINE

## 2024-05-01 PROCEDURE — 3078F DIAST BP <80 MM HG: CPT | Performed by: INTERNAL MEDICINE

## 2024-05-01 PROCEDURE — 3074F SYST BP LT 130 MM HG: CPT | Performed by: INTERNAL MEDICINE

## 2024-05-01 PROCEDURE — 3046F HEMOGLOBIN A1C LEVEL >9.0%: CPT | Performed by: INTERNAL MEDICINE

## 2024-05-01 PROCEDURE — 99213 OFFICE O/P EST LOW 20 MIN: CPT | Performed by: INTERNAL MEDICINE

## 2024-05-01 PROCEDURE — 1036F TOBACCO NON-USER: CPT | Performed by: INTERNAL MEDICINE

## 2024-05-01 NOTE — PROGRESS NOTES
Subjective   Patient ID: Jay Rebolledo is a 74 y.o. male who presents for Follow-up (4 week ).    HPI     Am sugars , no lows he did not bring his log with him .   Reports compliance with insulin     Review of Systems   All other systems reviewed and are negative.      Objective   BP 99/68   Pulse 61   Wt 74.8 kg (165 lb)   SpO2 95%   BMI 25.09 kg/m²     Physical Exam  Constitutional:       Appearance: Normal appearance.   Pulmonary:      Effort: Pulmonary effort is normal.   Neurological:      Mental Status: He is alert.   Psychiatric:         Mood and Affect: Mood normal.         Behavior: Behavior normal.         Thought Content: Thought content normal.         Assessment/Plan       #DM2  -Sugars are improved but still high   -Increase Semglee to 36 units daily   -Check A1c , will lakia add AC lispro for next adjustment

## 2024-05-15 ENCOUNTER — PATIENT OUTREACH (OUTPATIENT)
Dept: PRIMARY CARE | Facility: CLINIC | Age: 74
End: 2024-05-15
Payer: MEDICARE

## 2024-05-15 NOTE — PROGRESS NOTES
Final call.  CM called and spoke with pt to address any final questions or concerns regarding hospitalization.  Pt reports doing well and has no concerns at this time.  Pt given my contact info  in the event questions should arise

## 2024-06-08 NOTE — PROGRESS NOTES
Subjective   Patient ID: Jay Rebolledo is a 74 y.o. male who presents for Follow-up (6 week follow up ).    HPI     Here today for DM check, did not bring log. Only lows are if he doesn't eat. A1c 6/10/24- 12.7%  Reports compliance with Lantus , he did cheat on his diet while on vacation the last 2 weeks     Review of Systems   All other systems reviewed and are negative.      Objective   There were no vitals taken for this visit.    Physical Exam  Constitutional:       Appearance: Normal appearance.   HENT:      Head: Normocephalic and atraumatic.   Cardiovascular:      Rate and Rhythm: Normal rate and regular rhythm.      Heart sounds: Normal heart sounds. No murmur heard.     No gallop.   Pulmonary:      Effort: Pulmonary effort is normal. No respiratory distress.      Breath sounds: No wheezing or rales.   Skin:     General: Skin is warm and dry.      Findings: No rash.   Neurological:      Mental Status: He is alert and oriented to person, place, and time. Mental status is at baseline.   Psychiatric:         Mood and Affect: Mood normal.         Behavior: Behavior normal.         Assessment/Plan       DM1  -Cpeptide 0.6  -Add Humalog 3 U TID AC. Cont Semglee 32 units daily  -Cont accuchecks with one 2 postprandial daily     RTC 4 weeks, will need full panel of labs in 3 months   -

## 2024-06-10 ENCOUNTER — LAB (OUTPATIENT)
Dept: LAB | Facility: LAB | Age: 74
End: 2024-06-10
Payer: MEDICARE

## 2024-06-10 DIAGNOSIS — E11.65 TYPE 2 DIABETES MELLITUS WITH HYPERGLYCEMIA, WITHOUT LONG-TERM CURRENT USE OF INSULIN (MULTI): ICD-10-CM

## 2024-06-10 LAB
EST. AVERAGE GLUCOSE BLD GHB EST-MCNC: 318 MG/DL
HBA1C MFR BLD: 12.7 %

## 2024-06-10 PROCEDURE — 36415 COLL VENOUS BLD VENIPUNCTURE: CPT

## 2024-06-10 PROCEDURE — 83036 HEMOGLOBIN GLYCOSYLATED A1C: CPT

## 2024-06-12 ENCOUNTER — OFFICE VISIT (OUTPATIENT)
Dept: PRIMARY CARE | Facility: CLINIC | Age: 74
End: 2024-06-12
Payer: MEDICARE

## 2024-06-12 ENCOUNTER — TELEPHONE (OUTPATIENT)
Dept: PRIMARY CARE | Facility: CLINIC | Age: 74
End: 2024-06-12

## 2024-06-12 VITALS
DIASTOLIC BLOOD PRESSURE: 71 MMHG | WEIGHT: 167 LBS | HEART RATE: 60 BPM | BODY MASS INDEX: 26.21 KG/M2 | SYSTOLIC BLOOD PRESSURE: 111 MMHG | HEIGHT: 67 IN | OXYGEN SATURATION: 97 %

## 2024-06-12 DIAGNOSIS — E11.65 TYPE 2 DIABETES MELLITUS WITH HYPERGLYCEMIA, WITHOUT LONG-TERM CURRENT USE OF INSULIN (MULTI): Primary | ICD-10-CM

## 2024-06-12 PROCEDURE — 1160F RVW MEDS BY RX/DR IN RCRD: CPT | Performed by: INTERNAL MEDICINE

## 2024-06-12 PROCEDURE — 99213 OFFICE O/P EST LOW 20 MIN: CPT | Performed by: INTERNAL MEDICINE

## 2024-06-12 PROCEDURE — 3046F HEMOGLOBIN A1C LEVEL >9.0%: CPT | Performed by: INTERNAL MEDICINE

## 2024-06-12 PROCEDURE — 1159F MED LIST DOCD IN RCRD: CPT | Performed by: INTERNAL MEDICINE

## 2024-06-12 PROCEDURE — 3078F DIAST BP <80 MM HG: CPT | Performed by: INTERNAL MEDICINE

## 2024-06-12 PROCEDURE — 3050F LDL-C >= 130 MG/DL: CPT | Performed by: INTERNAL MEDICINE

## 2024-06-12 PROCEDURE — 1036F TOBACCO NON-USER: CPT | Performed by: INTERNAL MEDICINE

## 2024-06-12 PROCEDURE — 3074F SYST BP LT 130 MM HG: CPT | Performed by: INTERNAL MEDICINE

## 2024-06-12 PROCEDURE — 1124F ACP DISCUSS-NO DSCNMKR DOCD: CPT | Performed by: INTERNAL MEDICINE

## 2024-06-12 RX ORDER — WARFARIN SODIUM 5 MG/1
1 TABLET ORAL
COMMUNITY
Start: 2024-04-10

## 2024-06-12 RX ORDER — INSULIN ASPART 100 [IU]/ML
3 INJECTION, SOLUTION INTRAVENOUS; SUBCUTANEOUS
Qty: 15 ML | Refills: 1 | Status: SHIPPED | OUTPATIENT
Start: 2024-06-12 | End: 2025-06-12

## 2024-06-12 RX ORDER — INSULIN LISPRO 100 [IU]/ML
3 INJECTION, SOLUTION INTRAVENOUS; SUBCUTANEOUS
Qty: 15 ML | Refills: 0 | Status: SHIPPED | OUTPATIENT
Start: 2024-06-12 | End: 2024-06-12 | Stop reason: ALTCHOICE

## 2024-06-12 ASSESSMENT — PATIENT HEALTH QUESTIONNAIRE - PHQ9
2. FEELING DOWN, DEPRESSED OR HOPELESS: NOT AT ALL
SUM OF ALL RESPONSES TO PHQ9 QUESTIONS 1 AND 2: 0
1. LITTLE INTEREST OR PLEASURE IN DOING THINGS: NOT AT ALL

## 2024-06-12 NOTE — PATIENT INSTRUCTIONS
Start meal time insulin, take directly before you eat. Cont Lantus as is   Come back in 4 weeks   Please check Sugar as you have been but please check once daily 2 hours after a meal     4 weeks

## 2024-07-05 ENCOUNTER — APPOINTMENT (OUTPATIENT)
Dept: PRIMARY CARE | Facility: CLINIC | Age: 74
End: 2024-07-05
Payer: MEDICARE

## 2024-07-31 ENCOUNTER — APPOINTMENT (OUTPATIENT)
Dept: PRIMARY CARE | Facility: CLINIC | Age: 74
End: 2024-07-31
Payer: MEDICARE

## 2024-07-31 VITALS
OXYGEN SATURATION: 95 % | HEIGHT: 67 IN | WEIGHT: 165 LBS | DIASTOLIC BLOOD PRESSURE: 65 MMHG | BODY MASS INDEX: 25.9 KG/M2 | SYSTOLIC BLOOD PRESSURE: 103 MMHG | HEART RATE: 66 BPM

## 2024-07-31 DIAGNOSIS — E11.65 TYPE 2 DIABETES MELLITUS WITH HYPERGLYCEMIA, WITHOUT LONG-TERM CURRENT USE OF INSULIN (MULTI): ICD-10-CM

## 2024-07-31 PROCEDURE — 3008F BODY MASS INDEX DOCD: CPT | Performed by: INTERNAL MEDICINE

## 2024-07-31 PROCEDURE — 1036F TOBACCO NON-USER: CPT | Performed by: INTERNAL MEDICINE

## 2024-07-31 PROCEDURE — 1159F MED LIST DOCD IN RCRD: CPT | Performed by: INTERNAL MEDICINE

## 2024-07-31 PROCEDURE — 3078F DIAST BP <80 MM HG: CPT | Performed by: INTERNAL MEDICINE

## 2024-07-31 PROCEDURE — 3050F LDL-C >= 130 MG/DL: CPT | Performed by: INTERNAL MEDICINE

## 2024-07-31 PROCEDURE — 3046F HEMOGLOBIN A1C LEVEL >9.0%: CPT | Performed by: INTERNAL MEDICINE

## 2024-07-31 PROCEDURE — 99213 OFFICE O/P EST LOW 20 MIN: CPT | Performed by: INTERNAL MEDICINE

## 2024-07-31 PROCEDURE — 3074F SYST BP LT 130 MM HG: CPT | Performed by: INTERNAL MEDICINE

## 2024-07-31 RX ORDER — INSULIN GLARGINE-YFGN 100 [IU]/ML
34 INJECTION, SOLUTION SUBCUTANEOUS NIGHTLY
Qty: 30 ML | Refills: 1 | Status: SHIPPED | OUTPATIENT
Start: 2024-07-31

## 2024-07-31 RX ORDER — INSULIN ASPART 100 [IU]/ML
4 INJECTION, SOLUTION INTRAVENOUS; SUBCUTANEOUS
Qty: 15 ML | Refills: 1 | Status: SHIPPED | OUTPATIENT
Start: 2024-07-31 | End: 2025-07-31

## 2024-07-31 NOTE — PROGRESS NOTES
"Subjective   Patient ID: Jay Rebolledo is a 74 y.o. male who presents for Follow-up (4 week FUV).    HPI     AM sugars 150-180. He has been inconsistent with his meal time insulin  Sugar was 90 this am and he did not feel well. Ate a donut and felt better       Review of Systems   All other systems reviewed and are negative.      Objective   /65   Pulse 66   Ht 1.702 m (5' 7\")   Wt 74.8 kg (165 lb)   SpO2 95%   BMI 25.84 kg/m²     Physical Exam  Constitutional:       Appearance: Normal appearance.   Pulmonary:      Effort: Pulmonary effort is normal.   Neurological:      Mental Status: He is alert.   Psychiatric:         Mood and Affect: Mood normal.         Behavior: Behavior normal.         Thought Content: Thought content normal.         Assessment/Plan        DM1    -Increase Humalog to 4 U TID AC.   -Increase Semglee to 34 units daily  -Cont accuchecks with one 2 postprandial daily      RTC 4 weeks, will need full panel of labs in 2 month  "

## 2024-09-05 ENCOUNTER — APPOINTMENT (OUTPATIENT)
Dept: PRIMARY CARE | Facility: CLINIC | Age: 74
End: 2024-09-05
Payer: MEDICARE

## 2024-09-19 ENCOUNTER — APPOINTMENT (OUTPATIENT)
Dept: PRIMARY CARE | Facility: CLINIC | Age: 74
End: 2024-09-19
Payer: MEDICARE

## 2024-09-19 VITALS
SYSTOLIC BLOOD PRESSURE: 114 MMHG | HEART RATE: 61 BPM | DIASTOLIC BLOOD PRESSURE: 73 MMHG | OXYGEN SATURATION: 95 % | HEIGHT: 69 IN | BODY MASS INDEX: 24.14 KG/M2 | WEIGHT: 163 LBS

## 2024-09-19 DIAGNOSIS — E78.5 HYPERLIPIDEMIA, UNSPECIFIED HYPERLIPIDEMIA TYPE: ICD-10-CM

## 2024-09-19 DIAGNOSIS — E11.65 TYPE 2 DIABETES MELLITUS WITH HYPERGLYCEMIA, WITHOUT LONG-TERM CURRENT USE OF INSULIN: Primary | ICD-10-CM

## 2024-09-19 LAB — POC HEMOGLOBIN A1C: 12.1 % (ref 4.2–6.5)

## 2024-09-19 PROCEDURE — 3078F DIAST BP <80 MM HG: CPT | Performed by: INTERNAL MEDICINE

## 2024-09-19 PROCEDURE — 1124F ACP DISCUSS-NO DSCNMKR DOCD: CPT | Performed by: INTERNAL MEDICINE

## 2024-09-19 PROCEDURE — 3050F LDL-C >= 130 MG/DL: CPT | Performed by: INTERNAL MEDICINE

## 2024-09-19 PROCEDURE — 1036F TOBACCO NON-USER: CPT | Performed by: INTERNAL MEDICINE

## 2024-09-19 PROCEDURE — 3008F BODY MASS INDEX DOCD: CPT | Performed by: INTERNAL MEDICINE

## 2024-09-19 PROCEDURE — 3074F SYST BP LT 130 MM HG: CPT | Performed by: INTERNAL MEDICINE

## 2024-09-19 PROCEDURE — 99214 OFFICE O/P EST MOD 30 MIN: CPT | Performed by: INTERNAL MEDICINE

## 2024-09-19 PROCEDURE — 3046F HEMOGLOBIN A1C LEVEL >9.0%: CPT | Performed by: INTERNAL MEDICINE

## 2024-09-19 PROCEDURE — 1159F MED LIST DOCD IN RCRD: CPT | Performed by: INTERNAL MEDICINE

## 2024-09-19 PROCEDURE — 83036 HEMOGLOBIN GLYCOSYLATED A1C: CPT | Performed by: INTERNAL MEDICINE

## 2024-09-19 RX ORDER — INSULIN GLARGINE-YFGN 100 [IU]/ML
44 INJECTION, SOLUTION SUBCUTANEOUS NIGHTLY
Start: 2024-09-19

## 2024-09-19 NOTE — PROGRESS NOTES
"Subjective   Patient ID: Jay Rebolledo is a 74 y.o. male who presents for Follow-up (1 month follow up).    HPI     Patient stated that his feet are burning and numb and hurt a lot. Not following diabetic diet. Forgets to take insulin before eating. Takes gabapentin 3 times a day not helping a lot. No change in vision.    This AM sugar 178. He has been inconsistent with his meal time insulin.     No recent hypoglycemic episodes.     No C/P, N/V, D, SOB, blurry vision.     Review of Systems   All other systems reviewed and are negative.      Objective   Ht 1.753 m (5' 9\")   Wt 73.9 kg (163 lb)   BMI 24.07 kg/m²     Physical Exam  Constitutional:       General: He is not in acute distress.     Appearance: Normal appearance.   HENT:      Head: Normocephalic and atraumatic.      Nose: Nose normal.   Eyes:      Conjunctiva/sclera: Conjunctivae normal.   Cardiovascular:      Rate and Rhythm: Normal rate and regular rhythm.      Pulses: Normal pulses.      Heart sounds: Normal heart sounds.   Pulmonary:      Effort: Pulmonary effort is normal.      Breath sounds: Normal breath sounds.   Abdominal:      General: Abdomen is flat.   Musculoskeletal:         General: No swelling. Normal range of motion.   Skin:     General: Skin is warm.   Neurological:      Mental Status: He is alert and oriented to person, place, and time.   Psychiatric:         Mood and Affect: Mood normal.         Behavior: Behavior normal.         Thought Content: Thought content normal.       Assessment/Plan      DM1  -POCT A1c 12.1%  -Cont Humalog to 4 U TID before meals.   -Increase Semglee to 44 units daily  - Cont accuchecks with one 2 postprandial daily   - Fasting blood work ordered.    RTC 6 weeks.    Amanda Thompson M.D.   Internal Medicine, PGY 1   "

## 2024-09-19 NOTE — PATIENT INSTRUCTIONS
Increase juan glee to 44 units  Cont meal time insulin at 4 units before meals   Please get fasting blood work before next appt in 6 weeks

## 2024-10-31 ENCOUNTER — APPOINTMENT (OUTPATIENT)
Dept: PRIMARY CARE | Facility: CLINIC | Age: 74
End: 2024-10-31
Payer: MEDICARE

## 2024-11-14 ENCOUNTER — LAB (OUTPATIENT)
Dept: LAB | Facility: LAB | Age: 74
End: 2024-11-14
Payer: MEDICARE

## 2024-11-14 DIAGNOSIS — E78.5 HYPERLIPIDEMIA, UNSPECIFIED HYPERLIPIDEMIA TYPE: ICD-10-CM

## 2024-11-14 DIAGNOSIS — E11.65 TYPE 2 DIABETES MELLITUS WITH HYPERGLYCEMIA, WITHOUT LONG-TERM CURRENT USE OF INSULIN: ICD-10-CM

## 2024-11-14 LAB
ALBUMIN SERPL BCP-MCNC: 3.9 G/DL (ref 3.4–5)
ALP SERPL-CCNC: 78 U/L (ref 33–136)
ALT SERPL W P-5'-P-CCNC: 17 U/L (ref 10–52)
ANION GAP SERPL CALC-SCNC: 12 MMOL/L (ref 10–20)
AST SERPL W P-5'-P-CCNC: 16 U/L (ref 9–39)
BILIRUB SERPL-MCNC: 0.7 MG/DL (ref 0–1.2)
BUN SERPL-MCNC: 18 MG/DL (ref 6–23)
CALCIUM SERPL-MCNC: 8.7 MG/DL (ref 8.6–10.3)
CHLORIDE SERPL-SCNC: 102 MMOL/L (ref 98–107)
CHOLEST SERPL-MCNC: 125 MG/DL (ref 0–199)
CHOLESTEROL/HDL RATIO: 2.7
CO2 SERPL-SCNC: 29 MMOL/L (ref 21–32)
CREAT SERPL-MCNC: 0.88 MG/DL (ref 0.5–1.3)
EGFRCR SERPLBLD CKD-EPI 2021: >90 ML/MIN/1.73M*2
ERYTHROCYTE [DISTWIDTH] IN BLOOD BY AUTOMATED COUNT: 12.6 % (ref 11.5–14.5)
GLUCOSE SERPL-MCNC: 205 MG/DL (ref 74–99)
HCT VFR BLD AUTO: 46.4 % (ref 41–52)
HDLC SERPL-MCNC: 46.7 MG/DL
HGB BLD-MCNC: 14.5 G/DL (ref 13.5–17.5)
LDLC SERPL CALC-MCNC: 61 MG/DL
MCH RBC QN AUTO: 28.3 PG (ref 26–34)
MCHC RBC AUTO-ENTMCNC: 31.3 G/DL (ref 32–36)
MCV RBC AUTO: 90 FL (ref 80–100)
NON HDL CHOLESTEROL: 78 MG/DL (ref 0–149)
NRBC BLD-RTO: 0 /100 WBCS (ref 0–0)
PLATELET # BLD AUTO: 120 X10*3/UL (ref 150–450)
POTASSIUM SERPL-SCNC: 4.1 MMOL/L (ref 3.5–5.3)
PROT SERPL-MCNC: 6.7 G/DL (ref 6.4–8.2)
RBC # BLD AUTO: 5.13 X10*6/UL (ref 4.5–5.9)
SODIUM SERPL-SCNC: 139 MMOL/L (ref 136–145)
TRIGL SERPL-MCNC: 86 MG/DL (ref 0–149)
VLDL: 17 MG/DL (ref 0–40)
WBC # BLD AUTO: 6.2 X10*3/UL (ref 4.4–11.3)

## 2024-11-14 PROCEDURE — 80053 COMPREHEN METABOLIC PANEL: CPT

## 2024-11-14 PROCEDURE — 80061 LIPID PANEL: CPT

## 2024-11-14 PROCEDURE — 36415 COLL VENOUS BLD VENIPUNCTURE: CPT

## 2024-11-14 PROCEDURE — 82043 UR ALBUMIN QUANTITATIVE: CPT

## 2024-11-14 PROCEDURE — 85027 COMPLETE CBC AUTOMATED: CPT

## 2024-11-14 PROCEDURE — 82570 ASSAY OF URINE CREATININE: CPT

## 2024-11-15 ENCOUNTER — APPOINTMENT (OUTPATIENT)
Dept: PRIMARY CARE | Facility: CLINIC | Age: 74
End: 2024-11-15
Payer: MEDICARE

## 2024-11-15 VITALS
BODY MASS INDEX: 24.07 KG/M2 | SYSTOLIC BLOOD PRESSURE: 119 MMHG | OXYGEN SATURATION: 93 % | WEIGHT: 163 LBS | DIASTOLIC BLOOD PRESSURE: 70 MMHG | HEART RATE: 64 BPM

## 2024-11-15 DIAGNOSIS — G63 POLYNEUROPATHY ASSOCIATED WITH UNDERLYING DISEASE (MULTI): ICD-10-CM

## 2024-11-15 DIAGNOSIS — Z12.11 SCREENING FOR MALIGNANT NEOPLASM OF COLON: ICD-10-CM

## 2024-11-15 DIAGNOSIS — E78.5 HYPERLIPIDEMIA, UNSPECIFIED HYPERLIPIDEMIA TYPE: ICD-10-CM

## 2024-11-15 DIAGNOSIS — E11.65 TYPE 2 DIABETES MELLITUS WITH HYPERGLYCEMIA, WITHOUT LONG-TERM CURRENT USE OF INSULIN: Primary | ICD-10-CM

## 2024-11-15 LAB
CREAT UR-MCNC: 157.4 MG/DL (ref 20–370)
MICROALBUMIN UR-MCNC: 38.6 MG/L
MICROALBUMIN/CREAT UR: 24.5 UG/MG CREAT

## 2024-11-15 PROCEDURE — 3074F SYST BP LT 130 MM HG: CPT | Performed by: INTERNAL MEDICINE

## 2024-11-15 PROCEDURE — 3078F DIAST BP <80 MM HG: CPT | Performed by: INTERNAL MEDICINE

## 2024-11-15 PROCEDURE — 99214 OFFICE O/P EST MOD 30 MIN: CPT | Performed by: INTERNAL MEDICINE

## 2024-11-15 PROCEDURE — 1159F MED LIST DOCD IN RCRD: CPT | Performed by: INTERNAL MEDICINE

## 2024-11-15 PROCEDURE — 3046F HEMOGLOBIN A1C LEVEL >9.0%: CPT | Performed by: INTERNAL MEDICINE

## 2024-11-15 PROCEDURE — 3060F POS MICROALBUMINURIA REV: CPT | Performed by: INTERNAL MEDICINE

## 2024-11-15 PROCEDURE — 3048F LDL-C <100 MG/DL: CPT | Performed by: INTERNAL MEDICINE

## 2024-11-15 RX ORDER — GABAPENTIN 300 MG/1
300 CAPSULE ORAL 2 TIMES DAILY
Qty: 60 CAPSULE | Refills: 3 | Status: SHIPPED | OUTPATIENT
Start: 2024-11-15 | End: 2025-03-15

## 2024-11-15 NOTE — PATIENT INSTRUCTIONS
Increased mealtime insulin to 6 units before meals  Decreased Semglee to 40 units at night time.  Take 300mg gabapentin two times daily.      Follow up in 2 months

## 2024-11-15 NOTE — PROGRESS NOTES
Subjective   Patient ID: Jay Rebolledo is a 74 y.o. male who presents for Follow-up.    HPI   Patient is here for a follow up.  Patient reports his feet have burning from his neuropathy.  Patient reports taking post prandial insulin about 50% of the time. AM glucose in the 60-70s  No CP, SOB, worsening CORDOVA or LE edema     Objective   /70   Pulse 64   Wt 73.9 kg (163 lb)   SpO2 93%   BMI 24.07 kg/m²     Physical Exam  Constitutional:       General: He is not in acute distress.  Cardiovascular:      Rate and Rhythm: Normal rate.      Heart sounds:      No friction rub. No gallop.   Pulmonary:      Effort: No respiratory distress.   Musculoskeletal:      Right lower leg: No edema.      Left lower leg: No edema.   Neurological:      Mental Status: He is alert. Mental status is at baseline.   Psychiatric:         Mood and Affect: Mood normal.         Behavior: Behavior normal.       Assessment/Plan     #DM1  -POCT A1c 12.1% 9/19/24 , Alb-creat WNL,   -Increased Humalog from 4u to 6u TID before meals.  -Decreased Semglee from 44u to 40u daily  -Cont accuchecks with one 2 hr postprandial daily     #Diabetic Neuropathy  -Patient endorses continued complaints of burning in his feet  -Increase gabapentin from 100 to 300mg TID (patient takes BID)    #GERD  -Continue Omeprazole    #BPH w/ Urinary Retention  -Follows with Dr. Beckman  -Continue tamsulosin     #Constipation  -Follows with GI  -Miralax, metamucil, fleet enema prn    #CAD s/p 4x CABG 2014 s/p PCI 1997  #HTN  #HLD  #Afib/Aflutter (diagnosed 2024)  #Mild b/l ICA stenosis  #Hx of Bradycardia  -Follows with Dr. Nobles  -Cont atorvastatin, warfarin, Metoprolol succ, ASA    Colorectal ca screening: Ordered cologuard  PSA: 1.7 01/2022    RTC 2 months    Andrez Russell DO, PGY-1  Internal Medicine    I saw and evaluated the patient. I personally obtained the key and critical portions of the history and physical exam or was physically present for key and critical  portions performed by the resident/fellow. I reviewed the resident/fellow's documentation and discussed the patient with the resident/fellow. I agree with the resident/fellow's medical decision making as documented in the note.    Sean Tariq, DO

## 2024-11-17 RX ORDER — INSULIN ASPART 100 [IU]/ML
6 INJECTION, SOLUTION INTRAVENOUS; SUBCUTANEOUS
Start: 2024-11-17 | End: 2025-11-17

## 2024-11-17 RX ORDER — INSULIN GLARGINE-YFGN 100 [IU]/ML
40 INJECTION, SOLUTION SUBCUTANEOUS NIGHTLY
Start: 2024-11-17

## 2025-01-04 ENCOUNTER — APPOINTMENT (OUTPATIENT)
Dept: RADIOLOGY | Facility: HOSPITAL | Age: 75
End: 2025-01-04
Payer: MEDICARE

## 2025-01-04 ENCOUNTER — HOSPITAL ENCOUNTER (EMERGENCY)
Facility: HOSPITAL | Age: 75
Discharge: HOME | End: 2025-01-04
Attending: STUDENT IN AN ORGANIZED HEALTH CARE EDUCATION/TRAINING PROGRAM
Payer: MEDICARE

## 2025-01-04 VITALS
HEIGHT: 69 IN | DIASTOLIC BLOOD PRESSURE: 76 MMHG | TEMPERATURE: 97.3 F | HEART RATE: 64 BPM | BODY MASS INDEX: 23.7 KG/M2 | RESPIRATION RATE: 16 BRPM | OXYGEN SATURATION: 98 % | WEIGHT: 160 LBS | SYSTOLIC BLOOD PRESSURE: 138 MMHG

## 2025-01-04 DIAGNOSIS — M79.662 PAIN OF LEFT CALF: Primary | ICD-10-CM

## 2025-01-04 DIAGNOSIS — M79.89 LEFT LEG SWELLING: ICD-10-CM

## 2025-01-04 LAB
ALBUMIN SERPL BCP-MCNC: 3.4 G/DL (ref 3.4–5)
ALP SERPL-CCNC: 74 U/L (ref 33–136)
ALT SERPL W P-5'-P-CCNC: 13 U/L (ref 10–52)
ANION GAP SERPL CALC-SCNC: 10 MMOL/L (ref 10–20)
AST SERPL W P-5'-P-CCNC: 12 U/L (ref 9–39)
BASOPHILS # BLD AUTO: 0.03 X10*3/UL (ref 0–0.1)
BASOPHILS NFR BLD AUTO: 0.4 %
BILIRUB SERPL-MCNC: 0.6 MG/DL (ref 0–1.2)
BUN SERPL-MCNC: 19 MG/DL (ref 6–23)
CALCIUM SERPL-MCNC: 8.4 MG/DL (ref 8.6–10.3)
CHLORIDE SERPL-SCNC: 101 MMOL/L (ref 98–107)
CO2 SERPL-SCNC: 28 MMOL/L (ref 21–32)
CREAT SERPL-MCNC: 0.93 MG/DL (ref 0.5–1.3)
EGFRCR SERPLBLD CKD-EPI 2021: 87 ML/MIN/1.73M*2
EOSINOPHIL # BLD AUTO: 0.12 X10*3/UL (ref 0–0.4)
EOSINOPHIL NFR BLD AUTO: 1.6 %
ERYTHROCYTE [DISTWIDTH] IN BLOOD BY AUTOMATED COUNT: 12.7 % (ref 11.5–14.5)
GLUCOSE SERPL-MCNC: 295 MG/DL (ref 74–99)
HCT VFR BLD AUTO: 37.9 % (ref 41–52)
HGB BLD-MCNC: 12.5 G/DL (ref 13.5–17.5)
IMM GRANULOCYTES # BLD AUTO: 0.02 X10*3/UL (ref 0–0.5)
IMM GRANULOCYTES NFR BLD AUTO: 0.3 % (ref 0–0.9)
INR PPP: 2.5 (ref 0.9–1.1)
LYMPHOCYTES # BLD AUTO: 1.54 X10*3/UL (ref 0.8–3)
LYMPHOCYTES NFR BLD AUTO: 19.9 %
MCH RBC QN AUTO: 28.6 PG (ref 26–34)
MCHC RBC AUTO-ENTMCNC: 33 G/DL (ref 32–36)
MCV RBC AUTO: 87 FL (ref 80–100)
MONOCYTES # BLD AUTO: 0.71 X10*3/UL (ref 0.05–0.8)
MONOCYTES NFR BLD AUTO: 9.2 %
NEUTROPHILS # BLD AUTO: 5.31 X10*3/UL (ref 1.6–5.5)
NEUTROPHILS NFR BLD AUTO: 68.6 %
NRBC BLD-RTO: 0 /100 WBCS (ref 0–0)
PLATELET # BLD AUTO: 261 X10*3/UL (ref 150–450)
POTASSIUM SERPL-SCNC: 4.2 MMOL/L (ref 3.5–5.3)
PROT SERPL-MCNC: 6.4 G/DL (ref 6.4–8.2)
PROTHROMBIN TIME: 29 SECONDS (ref 9.8–12.8)
RBC # BLD AUTO: 4.37 X10*6/UL (ref 4.5–5.9)
SODIUM SERPL-SCNC: 135 MMOL/L (ref 136–145)
WBC # BLD AUTO: 7.7 X10*3/UL (ref 4.4–11.3)

## 2025-01-04 PROCEDURE — 93971 EXTREMITY STUDY: CPT

## 2025-01-04 PROCEDURE — 85025 COMPLETE CBC W/AUTO DIFF WBC: CPT | Performed by: STUDENT IN AN ORGANIZED HEALTH CARE EDUCATION/TRAINING PROGRAM

## 2025-01-04 PROCEDURE — 85610 PROTHROMBIN TIME: CPT | Performed by: STUDENT IN AN ORGANIZED HEALTH CARE EDUCATION/TRAINING PROGRAM

## 2025-01-04 PROCEDURE — 73590 X-RAY EXAM OF LOWER LEG: CPT | Mod: LEFT SIDE | Performed by: RADIOLOGY

## 2025-01-04 PROCEDURE — 99285 EMERGENCY DEPT VISIT HI MDM: CPT | Mod: 25 | Performed by: STUDENT IN AN ORGANIZED HEALTH CARE EDUCATION/TRAINING PROGRAM

## 2025-01-04 PROCEDURE — 36415 COLL VENOUS BLD VENIPUNCTURE: CPT | Performed by: STUDENT IN AN ORGANIZED HEALTH CARE EDUCATION/TRAINING PROGRAM

## 2025-01-04 PROCEDURE — 73590 X-RAY EXAM OF LOWER LEG: CPT | Mod: LT

## 2025-01-04 PROCEDURE — 84075 ASSAY ALKALINE PHOSPHATASE: CPT | Performed by: STUDENT IN AN ORGANIZED HEALTH CARE EDUCATION/TRAINING PROGRAM

## 2025-01-04 PROCEDURE — 93971 EXTREMITY STUDY: CPT | Performed by: RADIOLOGY

## 2025-01-04 RX ORDER — ACETAMINOPHEN 325 MG/1
975 TABLET ORAL ONCE
Status: DISCONTINUED | OUTPATIENT
Start: 2025-01-04 | End: 2025-01-04 | Stop reason: HOSPADM

## 2025-01-04 ASSESSMENT — LIFESTYLE VARIABLES
TOTAL SCORE: 0
HAVE PEOPLE ANNOYED YOU BY CRITICIZING YOUR DRINKING: NO
HAVE YOU EVER FELT YOU SHOULD CUT DOWN ON YOUR DRINKING: NO
EVER FELT BAD OR GUILTY ABOUT YOUR DRINKING: NO
EVER HAD A DRINK FIRST THING IN THE MORNING TO STEADY YOUR NERVES TO GET RID OF A HANGOVER: NO

## 2025-01-04 ASSESSMENT — PAIN SCALES - GENERAL: PAINLEVEL_OUTOF10: 10 - WORST POSSIBLE PAIN

## 2025-01-04 ASSESSMENT — COLUMBIA-SUICIDE SEVERITY RATING SCALE - C-SSRS
2. HAVE YOU ACTUALLY HAD ANY THOUGHTS OF KILLING YOURSELF?: NO
6. HAVE YOU EVER DONE ANYTHING, STARTED TO DO ANYTHING, OR PREPARED TO DO ANYTHING TO END YOUR LIFE?: NO
1. IN THE PAST MONTH, HAVE YOU WISHED YOU WERE DEAD OR WISHED YOU COULD GO TO SLEEP AND NOT WAKE UP?: NO

## 2025-01-04 ASSESSMENT — PAIN - FUNCTIONAL ASSESSMENT: PAIN_FUNCTIONAL_ASSESSMENT: 0-10

## 2025-01-04 ASSESSMENT — PAIN DESCRIPTION - LOCATION: LOCATION: LEG

## 2025-01-04 ASSESSMENT — PAIN DESCRIPTION - ORIENTATION: ORIENTATION: LEFT

## 2025-01-04 NOTE — ED PROVIDER NOTES
Emergency Department Provider Note        History of Present Illness     History provided by: Patient  Limitations to History: None  External Records Reviewed with Brief Summary: None    HPI:  Jay Rebolledo is a 74 y.o. male with history of CAD, prior CABG, hyperlipidemia atrial flutter on Eliquis presenting to the emergency department with cath, ankle swelling for the past 4 days.  States that there has been no trauma or twisting of his ankle.  He has pain in his calf when he walks.  Denies any swelling in the right leg.  No orthopnea.  No shortness of breath or chest pain.  No fevers or chills.  Concerned about a blood clot    Physical Exam   Triage vitals:  T 36.3 °C (97.3 °F)  HR 61  /78  RR 16  O2 96 % None (Room air)    General: Awake, alert, in no acute distress  Eyes: Gaze conjugate.  No scleral icterus or injection  HENT: Normo-cephalic, atraumatic. No stridor  CV: Regular rate, regular rhythm.  DP pulses 2+ bilaterally  Resp: Breathing non-labored, speaking in full sentences.   GI: Soft, non-distended, non-tender. No rebound or guarding.  MSK/Extremities: No gross bony deformities. Moving all extremities.  The left leg is swollen compared to the right with significant tenderness to the left posterior calf.  He has swelling at the ankle as well, no bony tenderness to the left lower extremity.  No range of motion at the ankle, toes.  Normal sensation.  Skin: Warm. Appropriate color.  Mild erythema in the bilateral shins  Neuro: Alert. Oriented. Face symmetric. Speech is fluent.  Gross strength and sensation intact in b/l UE and LEs  Psych: Appropriate mood and affect    Medical Decision Making & ED Course   Medical Decision Makin y.o. male presenting the emergency department with concern for left DVT.  On arrival, vital signs within normal limits.  Exam concerning for DVT.  He has very mild erythema in the bilateral shins, but this appears to be venous stasis in origin, does not appear  cellulitic it is not warm.  Will obtain a left lower extremity DVT ultrasound, will obtain blood work to make this patient a need for anticoagulation.  Low concern for septic arthritis, cellulitis, or fracture at this time.    For the remainder of the patient's ED course and medical decision making, please see updates in the ED course section below.  ----      Differential diagnoses considered include but are not limited to: DVT, cellulitis, septic arthritis, fracture, CHF     Social Determinants of Health which Significantly Impact Care: None identified     EKG Independent Interpretation: EKG not obtained    Independent Result Review and Interpretation: Relevant laboratory and radiographic results were reviewed and independently interpreted by myself.  As necessary, they are commented on in the ED Course.    Chronic conditions affecting the patient's care: As documented above in [facility]    The patient was discussed with the following consultants/services: None    Care Considerations: As documented above in [facility]    ED Course:  ED Course as of 01/04/25 1712   Sat Jan 04, 2025   1448 Lower extremity venous duplex left  DVT ultrasound is negative.  Will have patient perform ambulation trial.  His x-ray was read as subcapital degenerative changes so his swelling may be arthritis related. [SH]   1540 Patient able to ambulate, has follow-up appointment on Monday with his PCP.  I discussed the negative workup with him.  Discussed possibility of arthritis being the cause of his pain.  Encouraged him to follow-up with PCP or return to the emergency department with any worsening symptoms. []      ED Course User Index  [SH] Jonathon Melara MD         Diagnoses as of 01/04/25 1712   Pain of left calf   Left leg swelling     Disposition   As a result of the work-up, the patient was discharged home.  he was informed of his diagnosis and instructed to come back with any concerns or worsening of condition.  he and was agreeable to the  plan as discussed above.  he was given the opportunity to ask questions.  All of the patient's questions were answered.    Procedures   Procedures        Jonathon Melara MD  Emergency Medicine     Jonathon Melara MD  01/04/25 1386

## 2025-01-04 NOTE — ED TRIAGE NOTES
Patient here for leg swelling. Left leg x4 days. Endorses 10/10 pain with ambulation in this leg. No redness, no heat. Calf and ankle are swollen. Pulses present

## 2025-01-04 NOTE — DISCHARGE INSTRUCTIONS
Follow-up with your primary care doctor on Monday.  Return to the emergency department if you develop any worsening pain, worsening swelling or any fevers.  We did not find a blood clot in your leg today and your x-ray showed evidence of arthritis.  Continue taking Tylenol as needed for pain.

## 2025-01-06 ENCOUNTER — APPOINTMENT (OUTPATIENT)
Dept: PRIMARY CARE | Facility: CLINIC | Age: 75
End: 2025-01-06
Payer: MEDICARE

## 2025-01-06 VITALS
OXYGEN SATURATION: 96 % | SYSTOLIC BLOOD PRESSURE: 117 MMHG | HEART RATE: 61 BPM | BODY MASS INDEX: 24.37 KG/M2 | DIASTOLIC BLOOD PRESSURE: 78 MMHG | WEIGHT: 165 LBS

## 2025-01-06 DIAGNOSIS — R60.0 LEG EDEMA, LEFT: Primary | ICD-10-CM

## 2025-01-06 PROCEDURE — 1125F AMNT PAIN NOTED PAIN PRSNT: CPT | Performed by: INTERNAL MEDICINE

## 2025-01-06 PROCEDURE — 99213 OFFICE O/P EST LOW 20 MIN: CPT | Performed by: INTERNAL MEDICINE

## 2025-01-06 PROCEDURE — 1159F MED LIST DOCD IN RCRD: CPT | Performed by: INTERNAL MEDICINE

## 2025-01-06 PROCEDURE — 1036F TOBACCO NON-USER: CPT | Performed by: INTERNAL MEDICINE

## 2025-01-06 RX ORDER — FUROSEMIDE 20 MG/1
20 TABLET ORAL DAILY
Qty: 30 TABLET | Refills: 0 | Status: SHIPPED | OUTPATIENT
Start: 2025-01-06 | End: 2026-01-06

## 2025-01-06 ASSESSMENT — PATIENT HEALTH QUESTIONNAIRE - PHQ9
SUM OF ALL RESPONSES TO PHQ9 QUESTIONS 1 AND 2: 0
1. LITTLE INTEREST OR PLEASURE IN DOING THINGS: NOT AT ALL
2. FEELING DOWN, DEPRESSED OR HOPELESS: NOT AT ALL

## 2025-01-06 ASSESSMENT — COLUMBIA-SUICIDE SEVERITY RATING SCALE - C-SSRS
1. IN THE PAST MONTH, HAVE YOU WISHED YOU WERE DEAD OR WISHED YOU COULD GO TO SLEEP AND NOT WAKE UP?: NO
6. HAVE YOU EVER DONE ANYTHING, STARTED TO DO ANYTHING, OR PREPARED TO DO ANYTHING TO END YOUR LIFE?: NO
2. HAVE YOU ACTUALLY HAD ANY THOUGHTS OF KILLING YOURSELF?: NO

## 2025-01-06 ASSESSMENT — PAIN SCALES - GENERAL: PAINLEVEL_OUTOF10: 4

## 2025-01-06 NOTE — PATIENT INSTRUCTIONS
Take furosemide in the am for the next week or so. cAn stop if swelling improves  Please see vascular  [phone]

## 2025-01-06 NOTE — PROGRESS NOTES
Subjective   Patient ID: Jay Rebolledo is a 74 y.o. male who presents for left leg edema.    HPI     Reports LLE edema x 4 days. Also reports calf pain. No CP, SOB or orthopnea. Went to ED on 1/4/25-- venous duplex neg for DVT. Had similar episode a few months ago, but was not as swollen.     Review of Systems   All other systems reviewed and are negative.      Objective   /78   Pulse 61   Wt 74.8 kg (165 lb)   SpO2 96%   BMI 24.37 kg/m²     Physical Exam  Constitutional:       Appearance: Normal appearance.   HENT:      Head: Normocephalic and atraumatic.   Cardiovascular:      Rate and Rhythm: Normal rate and regular rhythm.      Heart sounds: Normal heart sounds. No murmur heard.     No gallop.   Pulmonary:      Effort: Pulmonary effort is normal. No respiratory distress.      Breath sounds: No wheezing or rales.   Musculoskeletal:      Right lower leg: No edema.      Left lower leg: Edema present.   Skin:     General: Skin is warm and dry.      Findings: No rash.   Neurological:      Mental Status: He is alert and oriented to person, place, and time. Mental status is at baseline.   Psychiatric:         Mood and Affect: Mood normal.         Behavior: Behavior normal.         Assessment/Plan       #LLE edema  -1/4/25 venous duplex neg for DVT   -Trial furosemide 20mg daily, take for ~7 days and if swelling improves he can stop  -Recommended compression stocking   -Refer to vascular medicine for unilateral leg swelling

## 2025-01-16 ENCOUNTER — APPOINTMENT (OUTPATIENT)
Dept: PRIMARY CARE | Facility: CLINIC | Age: 75
End: 2025-01-16
Payer: MEDICARE

## 2025-01-27 ENCOUNTER — OFFICE VISIT (OUTPATIENT)
Dept: CARDIOLOGY | Facility: CLINIC | Age: 75
End: 2025-01-27
Payer: MEDICARE

## 2025-01-27 VITALS
SYSTOLIC BLOOD PRESSURE: 118 MMHG | WEIGHT: 163.5 LBS | HEART RATE: 56 BPM | OXYGEN SATURATION: 98 % | BODY MASS INDEX: 24.22 KG/M2 | DIASTOLIC BLOOD PRESSURE: 76 MMHG | HEIGHT: 69 IN

## 2025-01-27 DIAGNOSIS — I87.2 CHRONIC VENOUS INSUFFICIENCY: Primary | ICD-10-CM

## 2025-01-27 DIAGNOSIS — R60.0 LEG EDEMA, LEFT: ICD-10-CM

## 2025-01-27 PROCEDURE — 99213 OFFICE O/P EST LOW 20 MIN: CPT | Performed by: INTERNAL MEDICINE

## 2025-01-27 PROCEDURE — 1159F MED LIST DOCD IN RCRD: CPT | Performed by: INTERNAL MEDICINE

## 2025-01-27 PROCEDURE — 1125F AMNT PAIN NOTED PAIN PRSNT: CPT | Performed by: INTERNAL MEDICINE

## 2025-01-27 PROCEDURE — 99203 OFFICE O/P NEW LOW 30 MIN: CPT | Performed by: INTERNAL MEDICINE

## 2025-01-27 PROCEDURE — 1036F TOBACCO NON-USER: CPT | Performed by: INTERNAL MEDICINE

## 2025-01-27 PROCEDURE — 3008F BODY MASS INDEX DOCD: CPT | Performed by: INTERNAL MEDICINE

## 2025-01-27 ASSESSMENT — ENCOUNTER SYMPTOMS
LOSS OF SENSATION IN FEET: 0
DEPRESSION: 0
OCCASIONAL FEELINGS OF UNSTEADINESS: 0

## 2025-01-27 ASSESSMENT — PAIN SCALES - GENERAL: PAINLEVEL_OUTOF10: 7

## 2025-01-27 NOTE — PATIENT INSTRUCTIONS
You can obtain compression socks at [employer]. Make sure you check the package to ensure appropriate size. You should look for socks that are 15-20 mm Hg.    Alternatively, you can get compression socks at [employer] or other medical supply story. They will measure you to ensure proper fit.    To help with swelling, try to get out and walk around during your work day if you are at a place where you can do so.    In the evening if you are sitting while reading or watching TV you should elevate your legs.    Moisturize at night with a mild cream like Cerave, Cetaphil, Natividad, Aveeno. Look for the store brand, which usually is identical but several dollars cheaper.    If you start having swelling issues again please give me a call.    Should you have questions, please do not hesitate to call the office at [phone], or you can reach me on [employer] if you have signed up for it. If you have urgent concerns, call the office, do not use [employer].     No

## 2025-01-27 NOTE — PROGRESS NOTES
Referred by Sean Tariq DO      History of Present Illness:  Jay Rebolledo is a/an 74 y.o. man with CAD status post CABG, uncontrolled DM with neuropathy, hypertension, hyperlipidemia, and atrial fibrillation on warfarin referred for left leg swelling.    Started in late December. Granddaughter who is [employer] tech noticed and asked him to go be evaluated. Seen in emergency department and ruled out for deep vein thrombosis. Dr. Tariq started him on furosemide and swelling resolved and has stayed OK after being off furosemide.    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    History of vein harvest on contralateral leg.      Past Medical History:   Diagnosis Date    Other conditions influencing health status 03/03/2022    History of cough     Past Surgical History:   Procedure Laterality Date    OTHER SURGICAL HISTORY  02/04/2019    Rotator cuff repair    OTHER SURGICAL HISTORY  02/04/2019    Percutaneous transluminal coronary angioplasty    OTHER SURGICAL HISTORY  02/04/2019    Coronary artery bypass graft    OTHER SURGICAL HISTORY  02/04/2019    Inguinal hernia repair     Social History     Tobacco Use    Smoking status: Former     Types: Cigarettes    Smokeless tobacco: Never   Vaping Use    Vaping status: Never Used   Substance Use Topics    Alcohol use: Yes     Alcohol/week: 1.0 standard drink of alcohol     Types: 1 Cans of beer per week     Comment: socially    Drug use: Never     No family history on file.  Current Outpatient Medications   Medication Sig Dispense Refill    amiodarone (Pacerone) 200 mg tablet Take 1 tablet (200 mg) by mouth once daily. 90 tablet 1    atorvastatin (Lipitor) 80 mg tablet Take 1 tablet (80 mg) by mouth once daily at bedtime. 30 tablet 0    docusate sodium (Dulcolax Stool Softener, dss,) 100 mg capsule Take 1 capsule (100 mg) by mouth 3 times a day as needed for constipation. 30 capsule 1    FreeStyle Td 3 [place] misc Use as instructed 1 each 0    FreeStyle Td 3 Sensor device Use  "as directed 5 each 5    furosemide (Lasix) 20 mg tablet Take 1 tablet (20 mg) by mouth once daily. 30 tablet 0    gabapentin (Neurontin) 300 mg capsule Take 1 capsule (300 mg) by mouth 2 times a day. 60 capsule 3    insulin aspart (NovoLOG Flexpen U-100 Insulin) 100 unit/mL (3 mL) pen Inject 6 Units under the skin 3 times a day before meals. Take as directed per insulin instructions.      insulin glargine-yfgn (Semglee,insulin glarg-yfgn,Pen) 100 unit/mL (3 mL) Pen Inject 40 Units under the skin once daily at bedtime. Take as directed per insulin instructions.      metoprolol succinate XL (Toprol-XL) 25 mg 24 hr tablet Take 0.5 tablets (12.5 mg) by mouth once daily at bedtime. Do not crush or chew. 90 tablet 1    pen needle, diabetic (BD Jessy 2nd Gen Pen Needle) 32 gauge x 5/32\" needle Once daily 100 each 0    polyethylene glycol (Glycolax, Miralax) 17 gram/dose powder Take 17 g by mouth once daily. 238 g 0    warfarin (Coumadin) 5 mg tablet Take 1 tablet (5 mg) by mouth early in the morning..       No current facility-administered medications for this visit.       Physical Examination:  Blood pressure 118/76, pulse 56, height 1.753 m (5' 9\"), weight 74.2 kg (163 lb 8 oz), SpO2 98%.  General: well-developed, well-nourished, no distress  Head: normocephalic, atraumatic  Eyes: PERRL, anicteric sclerae, no conjunctival injection  ENT: normal oropharynx  Neck: supple, no carotid bruits, no JVD  Lungs: normal respiratory effort, clear to auscultation bilaterally  Heart: regular, normal S1 and S2, no murmurs, rubs or gallops  Abdomen: normal active bowel sounds, soft, non-distended, non-tender  Extremities: no cyanosis or clubbing  Vascular: mild left ankle edema, pulses 2+ and symmetric, dependent plethora  Musculoskeletal: no deformities  Neurological: alert and oriented, no gross neurological deficits  Psychological: normal mood and affect   Skin: warm and dry, no rashes or lesions; xerosis of the legs        Pertinent " Labs:    Pertinent Imaging:  Venous duplex ultrasound 1/4/2025 (personally reviewed)  The calf veins are not imaged in grayscale--no compression images.  So non-occlusive DVT cannot be ruled out.     FINDINGS:  There is normal compressibility and flow seen within the left common  femoral, femoral, and popliteal veins with flow seen in the posterior  tibial and peroneal veins. Spontaneous phasic waveform is seen.      IMPRESSION:  No evidence of DVT within the left lower extremity.    Diagnoses and all orders for this visit:  Chronic venous insufficiency (Primary)  -     Compression Stockings 15-20 mmHg  Leg edema, left  -     Referral to Vascular Medicine      Joan Mulligan MD, MS

## 2025-01-30 ENCOUNTER — APPOINTMENT (OUTPATIENT)
Dept: PRIMARY CARE | Facility: CLINIC | Age: 75
End: 2025-01-30
Payer: MEDICARE

## 2025-01-30 VITALS
WEIGHT: 161 LBS | OXYGEN SATURATION: 96 % | SYSTOLIC BLOOD PRESSURE: 129 MMHG | DIASTOLIC BLOOD PRESSURE: 77 MMHG | BODY MASS INDEX: 23.78 KG/M2 | HEART RATE: 61 BPM

## 2025-01-30 DIAGNOSIS — C43.61 MALIGNANT MELANOMA OF RIGHT UPPER EXTREMITY INCLUDING SHOULDER (MULTI): ICD-10-CM

## 2025-01-30 DIAGNOSIS — E10.42 TYPE 1 DIABETES MELLITUS WITH DIABETIC POLYNEUROPATHY: ICD-10-CM

## 2025-01-30 DIAGNOSIS — Z00.00 ROUTINE GENERAL MEDICAL EXAMINATION AT HEALTH CARE FACILITY: Primary | ICD-10-CM

## 2025-01-30 DIAGNOSIS — Z12.11 ENCOUNTER FOR SCREENING COLONOSCOPY: ICD-10-CM

## 2025-01-30 LAB — POC HEMOGLOBIN A1C: 14 % (ref 4.2–6.5)

## 2025-01-30 RX ORDER — INSULIN ASPART 100 [IU]/ML
6 INJECTION, SOLUTION INTRAVENOUS; SUBCUTANEOUS
Qty: 16.2 ML | Refills: 1 | Status: SHIPPED | OUTPATIENT
Start: 2025-01-30 | End: 2025-07-29

## 2025-01-30 ASSESSMENT — COLUMBIA-SUICIDE SEVERITY RATING SCALE - C-SSRS
6. HAVE YOU EVER DONE ANYTHING, STARTED TO DO ANYTHING, OR PREPARED TO DO ANYTHING TO END YOUR LIFE?: NO
1. IN THE PAST MONTH, HAVE YOU WISHED YOU WERE DEAD OR WISHED YOU COULD GO TO SLEEP AND NOT WAKE UP?: NO
2. HAVE YOU ACTUALLY HAD ANY THOUGHTS OF KILLING YOURSELF?: NO

## 2025-01-30 ASSESSMENT — ACTIVITIES OF DAILY LIVING (ADL)
BATHING: INDEPENDENT
TAKING_MEDICATION: INDEPENDENT
MANAGING_FINANCES: INDEPENDENT
DOING_HOUSEWORK: INDEPENDENT
DRESSING: INDEPENDENT
GROCERY_SHOPPING: INDEPENDENT

## 2025-01-30 ASSESSMENT — PATIENT HEALTH QUESTIONNAIRE - PHQ9
SUM OF ALL RESPONSES TO PHQ9 QUESTIONS 1 AND 2: 0
2. FEELING DOWN, DEPRESSED OR HOPELESS: NOT AT ALL
2. FEELING DOWN, DEPRESSED OR HOPELESS: NOT AT ALL
1. LITTLE INTEREST OR PLEASURE IN DOING THINGS: NOT AT ALL
SUM OF ALL RESPONSES TO PHQ9 QUESTIONS 1 AND 2: 0
2. FEELING DOWN, DEPRESSED OR HOPELESS: NOT AT ALL
1. LITTLE INTEREST OR PLEASURE IN DOING THINGS: NOT AT ALL
1. LITTLE INTEREST OR PLEASURE IN DOING THINGS: NOT AT ALL
SUM OF ALL RESPONSES TO PHQ9 QUESTIONS 1 AND 2: 0

## 2025-01-30 ASSESSMENT — ENCOUNTER SYMPTOMS
OCCASIONAL FEELINGS OF UNSTEADINESS: 0
NUMBNESS: 1
DEPRESSION: 0
LOSS OF SENSATION IN FEET: 0

## 2025-01-30 ASSESSMENT — PAIN SCALES - GENERAL: PAINLEVEL_OUTOF10: 0-NO PAIN

## 2025-01-30 NOTE — PATIENT INSTRUCTIONS
Insulin refilled.    Referral to endocrinology ordered. A1C is > 14 today.    Colonoscopy ordered.    [facility]: [phone]   [facility]: [phone]    Follow up in 3 months.    I do not need any legal help

## 2025-01-30 NOTE — PROGRESS NOTES
Subjective   Patient ID: Jay Rebolledo is a 74 y.o. male who presents for Medicare Annual Wellness Visit Subsequent.    HPI   This is a pleasant 74M here for follow up. He has issues with controlling his blood sugar. He ran out of insulin (NovoLOG) pens for about 1 months. Sugars has been around 200s. Peripheral neuropathy appears controlled currently, but occasionally flares.     His left leg swelling has improved from prior visits. He will get compression socks soon.       Review of Systems   Musculoskeletal:         Peripheral neuropathy   Neurological:  Positive for numbness.   All other systems reviewed and are negative.      Objective   /77   Pulse 61   Wt 73 kg (161 lb)   SpO2 96%   BMI 23.78 kg/m²     Physical Exam  Vitals reviewed.   Cardiovascular:      Rate and Rhythm: Normal rate and regular rhythm.      Heart sounds: Normal heart sounds. No murmur heard.     No gallop.   Pulmonary:      Breath sounds: Normal breath sounds.   Abdominal:      General: Abdomen is flat. There is no distension.      Palpations: Abdomen is soft.      Tenderness: There is no abdominal tenderness.   Musculoskeletal:      Right lower leg: No edema.      Left lower leg: No edema.   Skin:     General: Skin is warm and dry.   Neurological:      Mental Status: He is alert. Mental status is at baseline.   Psychiatric:         Mood and Affect: Mood normal.         Behavior: Behavior normal.         Assessment/Plan   Diagnoses and all orders for this visit:  Routine general medical examination at health care facility  -     1 Year Follow Up In Primary Care - Wellness Exam; Future  Encounter for screening colonoscopy  -     Colonoscopy Screening; Average Risk Patient; Future  Type 1 diabetes mellitus with diabetic polyneuropathy  -     insulin aspart (NovoLOG Flexpen U-100 Insulin) 100 unit/mL (3 mL) pen; Inject 6 Units under the skin 3 times a day before meals. Take as directed per insulin instructions.  -     Referral to  Endocrinology; Future      #DM1  -POCT A1c  >14 on 1/30/25, Alb-creat WNL  -Extremely noncompliant with insulin and diet  -Cont Humalog 6u TID before meals.  -Cont Semglee 40u daily  -Cont accuchecks with one 2 hr postprandial daily   -Will refer to endocrinology for discussion of insulin pump due numerous discussions about importance of insulin compliance      #Diabetic Neuropathy  -Patient endorses continued complaints of burning in his feet  -Cont gabapentin 300mg TID (patient takes BID)     #GERD  -Continue Omeprazole     #BPH w/ Urinary Retention  -Follows with Dr. Beckman  -Continue tamsulosin      #Constipation  -Follows with GI  -Miralax, metamucil, fleet enema prn     #CAD s/p 4x CABG 2014 s/p PCI 1997  #HTN  #HLD  #Afib/Aflutter (diagnosed 2024)  #Mild b/l ICA stenosis  #Hx of Bradycardia  -Follows with Dr. Nobles  -Cont atorvastatin, warfarin, Metoprolol succ, ASA       Colorectal ca screening: Ordered colonoscopy  PSA: 1.7 01/2022    Follow up 3 months    Rashel Manning DO  Internal Medicine, PGY-II    I saw and evaluated the patient. I personally obtained the key and critical portions of the history and physical exam or was physically present for key and critical portions performed by the resident/fellow. I reviewed the resident/fellow's documentation and discussed the patient with the resident/fellow. I agree with the resident/fellow's medical decision making as documented in the note.    Sean Tariq DO

## 2025-01-30 NOTE — PROGRESS NOTES
Subjective   Reason for Visit: Jay Rebolledo is an 74 y.o. male here for a Medicare Wellness visit.     Past Medical, Surgical, and Family History reviewed and updated in chart.    Reviewed all medications by prescribing practitioner or clinical pharmacist (such as prescriptions, OTCs, herbal therapies and supplements) and documented in the medical record.    HPI    Patient Care Team:  Sean Tariq DO as PCP - General (Internal Medicine)  Sean Tariq DO as PCP - [facility] Attributed Provider     Review of Systems    Objective   Vitals:  /77   Pulse 61   Wt 73 kg (161 lb)   SpO2 96%   BMI 23.78 kg/m²       Physical Exam    Assessment & Plan  Routine general medical examination at health care facility    Orders:  •  1 Year Follow Up In Primary Care - Wellness Exam; Future         {AWV Counseling (Optional):77975}

## 2025-02-01 PROBLEM — G82.20 PARAPARESIS (MULTI): Status: RESOLVED | Noted: 2024-01-10 | Resolved: 2025-02-01

## 2025-02-01 NOTE — ASSESSMENT & PLAN NOTE
I saw and evaluated the patient. I personally obtained the key and critical portions of the history and physical exam or was physically present for key and critical portions performed by the resident/fellow. I reviewed the resident/fellow's documentation and discussed the patient with the resident/fellow. I agree with the resident/fellow's medical decision making as documented in the note.    Sean Tariq, DO

## 2025-02-10 DIAGNOSIS — Z12.11 COLON CANCER SCREENING: Primary | ICD-10-CM

## 2025-02-10 RX ORDER — POLYETHYLENE GLYCOL 3350, SODIUM SULFATE ANHYDROUS, SODIUM BICARBONATE, SODIUM CHLORIDE, POTASSIUM CHLORIDE 236; 22.74; 6.74; 5.86; 2.97 G/4L; G/4L; G/4L; G/4L; G/4L
4000 POWDER, FOR SOLUTION ORAL ONCE
Qty: 4000 ML | Refills: 0 | Status: SHIPPED | OUTPATIENT
Start: 2025-02-10 | End: 2025-02-10

## 2025-02-13 ENCOUNTER — APPOINTMENT (OUTPATIENT)
Dept: OPERATING ROOM | Facility: HOSPITAL | Age: 75
End: 2025-02-13
Payer: MEDICARE

## 2025-03-06 RX ORDER — MEPERIDINE HYDROCHLORIDE 25 MG/ML
12.5 INJECTION INTRAMUSCULAR; INTRAVENOUS; SUBCUTANEOUS EVERY 10 MIN PRN
OUTPATIENT
Start: 2025-03-06

## 2025-03-06 RX ORDER — OXYCODONE HYDROCHLORIDE 5 MG/1
5 TABLET ORAL EVERY 4 HOURS PRN
OUTPATIENT
Start: 2025-03-06

## 2025-03-06 RX ORDER — SODIUM CHLORIDE, SODIUM LACTATE, POTASSIUM CHLORIDE, CALCIUM CHLORIDE 600; 310; 30; 20 MG/100ML; MG/100ML; MG/100ML; MG/100ML
100 INJECTION, SOLUTION INTRAVENOUS CONTINUOUS
OUTPATIENT
Start: 2025-03-06 | End: 2025-03-07

## 2025-03-06 RX ORDER — DIPHENHYDRAMINE HYDROCHLORIDE 50 MG/ML
12.5 INJECTION INTRAMUSCULAR; INTRAVENOUS ONCE AS NEEDED
OUTPATIENT
Start: 2025-03-06

## 2025-03-06 RX ORDER — ONDANSETRON HYDROCHLORIDE 2 MG/ML
4 INJECTION, SOLUTION INTRAVENOUS ONCE AS NEEDED
OUTPATIENT
Start: 2025-03-06

## 2025-03-06 RX ORDER — ALBUTEROL SULFATE 0.83 MG/ML
2.5 SOLUTION RESPIRATORY (INHALATION) ONCE AS NEEDED
OUTPATIENT
Start: 2025-03-06

## 2025-03-06 RX ORDER — DROPERIDOL 2.5 MG/ML
0.62 INJECTION, SOLUTION INTRAMUSCULAR; INTRAVENOUS ONCE AS NEEDED
OUTPATIENT
Start: 2025-03-06

## 2025-03-06 RX ORDER — SODIUM CHLORIDE, SODIUM LACTATE, POTASSIUM CHLORIDE, CALCIUM CHLORIDE 600; 310; 30; 20 MG/100ML; MG/100ML; MG/100ML; MG/100ML
20 INJECTION, SOLUTION INTRAVENOUS CONTINUOUS
OUTPATIENT
Start: 2025-03-06 | End: 2025-03-07

## 2025-03-11 ENCOUNTER — APPOINTMENT (OUTPATIENT)
Dept: OPERATING ROOM | Facility: HOSPITAL | Age: 75
End: 2025-03-11
Payer: MEDICARE

## 2025-03-31 ENCOUNTER — APPOINTMENT (OUTPATIENT)
Dept: OPERATING ROOM | Facility: HOSPITAL | Age: 75
End: 2025-03-31
Payer: MEDICARE

## 2025-04-08 ENCOUNTER — TELEPHONE (OUTPATIENT)
Dept: PRIMARY CARE | Facility: CLINIC | Age: 75
End: 2025-04-08
Payer: MEDICARE

## 2025-04-08 DIAGNOSIS — R33.9 URINE RETENTION: ICD-10-CM

## 2025-04-08 DIAGNOSIS — G63 POLYNEUROPATHY ASSOCIATED WITH UNDERLYING DISEASE: ICD-10-CM

## 2025-04-08 RX ORDER — GABAPENTIN 300 MG/1
300 CAPSULE ORAL 2 TIMES DAILY
Qty: 180 CAPSULE | Refills: 1 | Status: SHIPPED | OUTPATIENT
Start: 2025-04-08 | End: 2025-10-05

## 2025-04-10 RX ORDER — TAMSULOSIN HYDROCHLORIDE 0.4 MG/1
0.8 CAPSULE ORAL DAILY
Qty: 60 CAPSULE | Refills: 11 | Status: SHIPPED | OUTPATIENT
Start: 2025-04-10 | End: 2026-04-10

## 2025-04-22 ENCOUNTER — TELEPHONE (OUTPATIENT)
Dept: PREADMISSION TESTING | Facility: HOSPITAL | Age: 75
End: 2025-04-22
Payer: MEDICARE

## 2025-04-22 NOTE — TELEPHONE ENCOUNTER
SURGERY PRE-OPERATIVE INSTRUCTIONS    Patient instructed to call cardiologist re: if he can stop coumadin for Colonoscopy.  He has not stopped yet and procedure is on 4/24.  Patient also advised to check with GI clinic.  Patient has appt tomorrow for f/u with Dr. Tariq.  Patient told to ask about Lantus dosage the night before his procedure since he will be on a clear liquid diet.  Patient verbalized understanding and will make sure to check on both medications.  Patient also aware that his procedure Thursday could be cancelled.    *You will receive a phone call the day before your procedure  after 2pm, (or the Friday before your surgery if scheduled on a Monday.) Generally the hospital will be calling you with this information after that time.    *If you are taking GLP1 medications for type 2 diabetes or weight loss, hold these medications on the day of the procedure. START a clear liquid diet 24 hours before your surgery. On the day of your surgery/procedure, STOP all clear liquids 2 hours before your arrival time to the hospital. A clear liquid diet consists of clear liquids and foods that melt into a clear liquid. Clear liquids can and should contain sugar. This is only if you are taking a GLP1 medication.     *You are not to eat after midnight the night before the surgery. You may have up to 10 ounces of clear liquids up until 2 hours prior to arriving to the hospital. The exception is with medications you were instructed to take day of surgery.     *You may take tylenol for pain/discomfort as needed.     *Stop taking all aspirin products, ibuprofen (motrin/advil), naproxen (aleve/naprosyn) for one week prior to surgery.    *Stop taking all vitamins and supplements one week prior to surgery.     *You should not have alcoholic beverages for 24 hours before surgery.     *You should not smoke 24 hours prior to surgery.     *To help prevent surgical infections bathe/shower with Dial soap the evening before  surgery.    *You can wear deodorant but no lotion, powder, or perfume/cologne. You should remove all make-up and nail polish at home.    *If you wear glasses, please bring a case for the glasses with you.    *You will be asked to remove dentures and contacts.     *Please leave all valuables at home.    *You should wear loose, comfortable clothing that will accommodate bandages and/or casts.    *You should notify your doctor of any change in your condition (fever, cold, rash, etc). Surgery may need to be re-scheduled until a time you are in better health.    *A responsible adult is required to accompany you to and from the hospital if you are receiving anesthesia or a sedative. Patients are not permitted to drive for 24 hours after anesthesia.     *You can use the [facility] parking if you wish.     *If you have any further questions please call [facility] [phone].

## 2025-04-23 ENCOUNTER — APPOINTMENT (OUTPATIENT)
Dept: PRIMARY CARE | Facility: CLINIC | Age: 75
End: 2025-04-23
Payer: MEDICARE

## 2025-04-23 RX ORDER — ONDANSETRON HYDROCHLORIDE 2 MG/ML
4 INJECTION, SOLUTION INTRAVENOUS ONCE AS NEEDED
Status: CANCELLED | OUTPATIENT
Start: 2025-04-23

## 2025-04-23 RX ORDER — DROPERIDOL 2.5 MG/ML
0.62 INJECTION, SOLUTION INTRAMUSCULAR; INTRAVENOUS ONCE AS NEEDED
Status: CANCELLED | OUTPATIENT
Start: 2025-04-23

## 2025-04-24 ENCOUNTER — HOSPITAL ENCOUNTER (OUTPATIENT)
Dept: OPERATING ROOM | Facility: HOSPITAL | Age: 75
Setting detail: OUTPATIENT SURGERY
Discharge: HOME | End: 2025-04-24
Payer: MEDICARE

## 2025-04-24 DIAGNOSIS — Z12.11 ENCOUNTER FOR SCREENING COLONOSCOPY: ICD-10-CM

## 2025-04-24 LAB — GLUCOSE BLD MANUAL STRIP-MCNC: 231 MG/DL (ref 74–99)

## 2025-04-24 PROCEDURE — 82947 ASSAY GLUCOSE BLOOD QUANT: CPT

## 2025-04-24 RX ORDER — SODIUM CHLORIDE, SODIUM LACTATE, POTASSIUM CHLORIDE, CALCIUM CHLORIDE 600; 310; 30; 20 MG/100ML; MG/100ML; MG/100ML; MG/100ML
20 INJECTION, SOLUTION INTRAVENOUS CONTINUOUS
Status: DISCONTINUED | OUTPATIENT
Start: 2025-04-24 | End: 2025-04-25 | Stop reason: HOSPADM

## 2025-05-01 ENCOUNTER — APPOINTMENT (OUTPATIENT)
Dept: PRIMARY CARE | Facility: CLINIC | Age: 75
End: 2025-05-01
Payer: MEDICARE

## 2025-05-05 ENCOUNTER — APPOINTMENT (OUTPATIENT)
Dept: PRIMARY CARE | Facility: CLINIC | Age: 75
End: 2025-05-05
Payer: MEDICARE

## 2025-05-05 VITALS
WEIGHT: 160 LBS | OXYGEN SATURATION: 97 % | BODY MASS INDEX: 24.25 KG/M2 | DIASTOLIC BLOOD PRESSURE: 70 MMHG | SYSTOLIC BLOOD PRESSURE: 123 MMHG | HEIGHT: 68 IN | HEART RATE: 56 BPM

## 2025-05-05 DIAGNOSIS — E11.65 TYPE 2 DIABETES MELLITUS WITH HYPERGLYCEMIA, WITHOUT LONG-TERM CURRENT USE OF INSULIN: Primary | ICD-10-CM

## 2025-05-05 DIAGNOSIS — I25.810 CORONARY ARTERY DISEASE INVOLVING CORONARY BYPASS GRAFT OF NATIVE HEART, UNSPECIFIED WHETHER ANGINA PRESENT: ICD-10-CM

## 2025-05-05 DIAGNOSIS — E10.42 TYPE 1 DIABETES MELLITUS WITH DIABETIC POLYNEUROPATHY: ICD-10-CM

## 2025-05-05 LAB — POC HEMOGLOBIN A1C: 13.8 % (ref 4.2–6.5)

## 2025-05-05 PROCEDURE — 1036F TOBACCO NON-USER: CPT | Performed by: INTERNAL MEDICINE

## 2025-05-05 PROCEDURE — 99214 OFFICE O/P EST MOD 30 MIN: CPT | Performed by: INTERNAL MEDICINE

## 2025-05-05 PROCEDURE — 1126F AMNT PAIN NOTED NONE PRSNT: CPT | Performed by: INTERNAL MEDICINE

## 2025-05-05 PROCEDURE — 3078F DIAST BP <80 MM HG: CPT | Performed by: INTERNAL MEDICINE

## 2025-05-05 PROCEDURE — 3046F HEMOGLOBIN A1C LEVEL >9.0%: CPT | Performed by: INTERNAL MEDICINE

## 2025-05-05 PROCEDURE — 3074F SYST BP LT 130 MM HG: CPT | Performed by: INTERNAL MEDICINE

## 2025-05-05 PROCEDURE — G2211 COMPLEX E/M VISIT ADD ON: HCPCS | Performed by: INTERNAL MEDICINE

## 2025-05-05 PROCEDURE — 83036 HEMOGLOBIN GLYCOSYLATED A1C: CPT | Performed by: INTERNAL MEDICINE

## 2025-05-05 PROCEDURE — 1159F MED LIST DOCD IN RCRD: CPT | Performed by: INTERNAL MEDICINE

## 2025-05-05 PROCEDURE — 3008F BODY MASS INDEX DOCD: CPT | Performed by: INTERNAL MEDICINE

## 2025-05-05 RX ORDER — INSULIN GLARGINE-YFGN 100 [IU]/ML
55 INJECTION, SOLUTION SUBCUTANEOUS NIGHTLY
Qty: 3 ML | Refills: 0 | Status: SHIPPED | OUTPATIENT
Start: 2025-05-05

## 2025-05-05 ASSESSMENT — PATIENT HEALTH QUESTIONNAIRE - PHQ9
SUM OF ALL RESPONSES TO PHQ9 QUESTIONS 1 AND 2: 0
2. FEELING DOWN, DEPRESSED OR HOPELESS: NOT AT ALL
1. LITTLE INTEREST OR PLEASURE IN DOING THINGS: NOT AT ALL

## 2025-05-05 ASSESSMENT — ENCOUNTER SYMPTOMS: NUMBNESS: 1

## 2025-05-05 ASSESSMENT — PAIN SCALES - GENERAL: PAINLEVEL_OUTOF10: 0-NO PAIN

## 2025-05-05 NOTE — PATIENT INSTRUCTIONS
Increase semglee to 55 units daily   Call Dr. Moore office for meds   Please see podiatry --they will call you for appt     Come back in 6 months, fasting labs once week prior

## 2025-05-05 NOTE — PROGRESS NOTES
"Subjective   Patient ID: Jay Rebolledo is a 75 y.o. male who presents for 3 month Follow-up for his diabetes.     HPI   This is a pleasant 75M here for follow up. He has issues with controlling his blood sugar. 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 He has not been consistent with taking his meds, especially his humalog 6 units before meals (skips this most days). He checks his sugars about 1 time per day and they have been running high (200-400s). He tried to schedule an appt with an endocrinologist, but will only be seeing them in June. He acknowledges that he has not been following recommendations from his prior visit and expects his A1c to be elevated today.    He is running low on his warfarin and metoprolol but will call his cardiologist to get refills.     Peripheral neuropathy appears controlled currently, but occasionally flares. He started taking vitamin B (unsure which one) which he states has helped his neuropathy.    His left leg swelling has continued to improve. Not using compression stockings.     He did not get his colonoscopy done as he was unable to tolerate the bowel prep. He will be going to see his GI doctor in July to discuss.      Review of Systems   Musculoskeletal:         Peripheral neuropathy   Neurological:  Positive for numbness.   All other systems reviewed and are negative.      Objective   /70   Pulse 56   Ht 1.727 m (5' 8\")   Wt 72.6 kg (160 lb)   SpO2 97%   BMI 24.33 kg/m²     Physical Exam  Vitals reviewed.   Cardiovascular:      Rate and Rhythm: Normal rate and regular rhythm.      Heart sounds: Normal heart sounds. No murmur heard.     No gallop.   Pulmonary:      Breath sounds: Normal breath sounds.   Abdominal:      General: Abdomen is flat. There is no distension.      Palpations: Abdomen is soft.      Tenderness: There is no abdominal tenderness.   Musculoskeletal:      Right lower leg: No " edema.      Left lower leg: No edema.   Skin:     General: Skin is warm and dry.   Neurological:      Mental Status: He is alert. Mental status is at baseline.   Psychiatric:         Mood and Affect: Mood normal.         Behavior: Behavior normal.         Assessment/Plan   Diagnoses and all orders for this visit:  Type 1 diabetes mellitus with diabetic polyneuropathy  -     POCT glycosylated hemoglobin (Hb A1C) manually resulted        #DM1  -POCT A1c 13.8 on 5/5/2025  -Extremely noncompliant with insulin (especially Humalog) and diet  -Discontinue Humalog as patient has poor compliance  -Increase Semglee to 55u daily  -Cont accuchecks with one 2 hr postprandial daily   -Has an appt set up with endocrinology in June  -Discussed getting yearly eye exams with his ophthalmologist     #Diabetic Neuropathy  -Patient endorses continued complaints of burning in his feet  -Cont gabapentin 300mg TID  -Put in a referral for podiatry to start doing annual foot exams     #GERD  -Continue Omeprazole     #BPH w/ Urinary Retention  -Follows with Dr. Beckman  -Continue tamsulosin      #Constipation  -Follows with GI  -Miralax, metamucil, fleet enema prn     #CAD s/p 4x CABG 2014 s/p PCI 1997  #HTN  #HLD  #Afib/Aflutter (diagnosed 2024)  #Mild b/l ICA stenosis  #Hx of Bradycardia  -Follows with Dr. Nobles  -Cont atorvastatin, warfarin, Metoprolol succ, ASA. Will call them to get refills on medications.       Colorectal ca screening: Will be following up with GI in July (Dr. Garcia) to discuss next steps as patient was not able to tolerate bowel prep.    PSA: 1.7 01/2022    Follow up 6 months    Isaac Duran MD  Transitional Year Resident

## 2025-06-24 ENCOUNTER — APPOINTMENT (OUTPATIENT)
Dept: ENDOCRINOLOGY | Facility: CLINIC | Age: 75
End: 2025-06-24
Payer: MEDICARE

## 2025-06-24 VITALS
BODY MASS INDEX: 23.42 KG/M2 | WEIGHT: 154 LBS | HEART RATE: 63 BPM | SYSTOLIC BLOOD PRESSURE: 124 MMHG | DIASTOLIC BLOOD PRESSURE: 73 MMHG

## 2025-06-24 DIAGNOSIS — E10.65 TYPE 1 DIABETES MELLITUS WITH HYPERGLYCEMIA (MULTI): ICD-10-CM

## 2025-06-24 PROCEDURE — 1160F RVW MEDS BY RX/DR IN RCRD: CPT | Performed by: INTERNAL MEDICINE

## 2025-06-24 PROCEDURE — 3078F DIAST BP <80 MM HG: CPT | Performed by: INTERNAL MEDICINE

## 2025-06-24 PROCEDURE — 99214 OFFICE O/P EST MOD 30 MIN: CPT | Performed by: INTERNAL MEDICINE

## 2025-06-24 PROCEDURE — 3074F SYST BP LT 130 MM HG: CPT | Performed by: INTERNAL MEDICINE

## 2025-06-24 PROCEDURE — 1036F TOBACCO NON-USER: CPT | Performed by: INTERNAL MEDICINE

## 2025-06-24 PROCEDURE — G2211 COMPLEX E/M VISIT ADD ON: HCPCS | Performed by: INTERNAL MEDICINE

## 2025-06-24 PROCEDURE — 3046F HEMOGLOBIN A1C LEVEL >9.0%: CPT | Performed by: INTERNAL MEDICINE

## 2025-06-24 PROCEDURE — 1159F MED LIST DOCD IN RCRD: CPT | Performed by: INTERNAL MEDICINE

## 2025-06-24 RX ORDER — BLOOD-GLUCOSE,RECEIVER,CONT
EACH MISCELLANEOUS
Qty: 1 EACH | Refills: 0 | Status: SHIPPED | OUTPATIENT
Start: 2025-06-24

## 2025-06-24 RX ORDER — BLOOD-GLUCOSE SENSOR
EACH MISCELLANEOUS
Qty: 9 EACH | Refills: 3 | Status: SHIPPED | OUTPATIENT
Start: 2025-06-24

## 2025-06-24 ASSESSMENT — ENCOUNTER SYMPTOMS
FATIGUE: 1
SHORTNESS OF BREATH: 0
ARTHRALGIAS: 0
DIARRHEA: 0
BACK PAIN: 1
POLYDIPSIA: 0
ABDOMINAL PAIN: 0
ROS SKIN COMMENTS: DRY
CONSTIPATION: 0
COUGH: 0
HEADACHES: 0
FEVER: 0
NERVOUS/ANXIOUS: 0
FREQUENCY: 0
APPETITE CHANGE: 0
DIFFICULTY URINATING: 1
NAUSEA: 0
SORE THROAT: 0
VOMITING: 0

## 2025-06-24 NOTE — LETTER
June 24, 2025     Sean Tariq DO  [address]  [address]  [facility] [address]    Patient: Jay Rebolledo   YOB: 1950   Date of Visit: 6/24/2025       Dear Dr. Sean Tariq DO:    Thank you for referring Jay Rebolledo to me for evaluation. Below are my notes for this consultation.  If you have questions, please do not hesitate to call me. I look forward to following your patient along with you.       Sincerely,     Thomas Yeung MD      CC: No Recipients  ______________________________________________________________________________________    History Of Present Illness  Jay Rebolledo is a 75 y.o. male     Duration of type 2 diabetes mellitus:  10 years  Complications:  peripheral neuropathy  CAD    Started insulin 2 years ago    Weight loss 7 lbs in 2 months  Urinating every hour.   Excess thirst  Stated hypoglycemia if he does not eat    Semglee 44 units at bedtime  Novolog 6 units before meals, remembering about twice per day    Patient is not testing glucose     Last eye exam:  2023    Past Medical History  He has a past medical history of Atrial fibrillation and flutter, BPH (benign prostatic hyperplasia), Coronary artery disease, Diabetes mellitus type I (Multi), Encounter for screening colonoscopy (04/24/2025), GERD (gastroesophageal reflux disease), Hyperlipidemia, Hypertension, Long term (current) use of anticoagulants, Other conditions influencing health status (03/03/2022), and Peripheral neuropathy.    Surgical History  He has a past surgical history that includes Rotator cuff repair (02/04/2019); Coronary angioplasty (02/04/2019); Coronary artery bypass graft (2014); Hernia repair (02/04/2019); and Coronary angioplasty with stent (1997).     Social History  He reports that he has quit smoking. His smoking use included cigarettes. He has never used smokeless tobacco. He reports current alcohol use of about 1.0 standard drink of alcohol per week. He reports that he does not use  "drugs.    Family History  Family History[1]    Medications  Current Outpatient Medications   Medication Instructions   • atorvastatin (LIPITOR) 80 mg, oral, Nightly   • FreeStyle Td 3 [place] misc Use as instructed   • FreeStyle Td 3 Sensor device Use as directed   • gabapentin (NEURONTIN) 300 mg, oral, 2 times daily   • insulin aspart (NOVOLOG FLEXPEN U-100 INSULIN) 6 Units, subcutaneous, 3 times daily before meals, Take as directed per insulin instructions.   • insulin glargine-yfgn (SEMGLEE(INSULIN GLARG-YFGN)PEN) 55 Units, subcutaneous, Nightly, Take as directed per insulin instructions.   • metoprolol succinate XL (TOPROL-XL) 12.5 mg, oral, Nightly, Do not crush or chew.   • pen needle, diabetic (BD Jessy 2nd Gen Pen Needle) 32 gauge x 5/32\" needle Once daily   • polyethylene glycol (GLYCOLAX, MIRALAX) 17 g, oral, Daily   • tamsulosin (FLOMAX) 0.8 mg, oral, Daily   • warfarin (Coumadin) 5 mg tablet 1 tablet, Daily (0630)       Allergies  Penicillins    Review of Systems   Constitutional:  Positive for fatigue. Negative for appetite change and fever.   HENT:  Negative for sore throat.         Denies dry mouth   Eyes:  Negative for visual disturbance.   Respiratory:  Negative for cough and shortness of breath.    Cardiovascular:  Negative for chest pain.   Gastrointestinal:  Negative for abdominal pain, constipation, diarrhea, nausea and vomiting.   Endocrine: Negative for polydipsia and polyuria.   Genitourinary:  Positive for difficulty urinating. Negative for frequency.        Prostatism  Erectile dysfunction   Musculoskeletal:  Positive for back pain. Negative for arthralgias.   Skin:  Negative for rash.        dry   Neurological:  Negative for headaches.   Psychiatric/Behavioral:  The patient is not nervous/anxious.          Last Recorded Vitals  Blood pressure 124/73, pulse 63, weight 69.9 kg (154 lb).    Physical Exam  Constitutional:       General: He is not in acute distress.  HENT:      Head: " Normocephalic.      Mouth/Throat:      Mouth: Mucous membranes are moist.   Eyes:      Extraocular Movements: Extraocular movements intact.   Neck:      Thyroid: No thyroid mass or thyromegaly.   Cardiovascular:      Pulses:           Radial pulses are 2+ on the right side and 2+ on the left side.   Musculoskeletal:      Right lower leg: No edema.      Left lower leg: No edema.   Feet:      Comments: Flat feet.  Ecchymosis right 2nd-3rd toenails.   Lymphadenopathy:      Cervical: No cervical adenopathy.   Skin:     Comments: No active foot sores   Neurological:      Mental Status: He is alert.      Motor: No tremor.   Psychiatric:         Mood and Affect: Affect normal.          Relevant Results  Glucose (mg/dL)   Date Value   01/04/2025 295 (H)   11/14/2024 205 (H)   02/08/2024 146 (H)     POC HEMOGLOBIN A1c (%)   Date Value   05/05/2025 13.8 (A)   01/30/2025 14.0 (A)   09/19/2024 12.1 (A)     Bicarbonate (mmol/L)   Date Value   01/04/2025 28   11/14/2024 29   02/08/2024 26     Urea Nitrogen (mg/dL)   Date Value   01/04/2025 19   11/14/2024 18   02/08/2024 15     Creatinine (mg/dL)   Date Value   01/04/2025 0.93   11/14/2024 0.88   02/08/2024 0.85     Lab Results   Component Value Date    CHOL 125 11/14/2024    CHOL 238 (H) 01/30/2024    CHOL 228 (H) 05/05/2022     Lab Results   Component Value Date    HDL 46.7 11/14/2024    HDL 49.4 01/30/2024    HDL 51.4 05/05/2022     Lab Results   Component Value Date    LDLCALC 61 11/14/2024    LDLCALC 139 (H) 01/30/2024     Lab Results   Component Value Date    TRIG 86 11/14/2024    TRIG 249 (H) 01/30/2024    TRIG 113 05/05/2022     Lab Results   Component Value Date    TSH 5.73 (H) 02/07/2024      Latest Reference Range & Units 02/09/24 09:36   C-Peptide 0.7 - 3.9 ng/mL 0.6 (L)   Glutamic Acid Decarb Ab 0.0 - 5.0 IU/mL <5.0       IMPRESSION  INSULIN DEPENDENT DIABETES MELLITUS WITH HYPERGLYCEMIA  Complicated by neuropathy and CAD  Poorly controlled glucose for at least 4  years  Longstanding symptomatic hyperglycemia  No current glucose testing  Evidence of progressed insulin deficiency by c-peptide  No antibody evidence of type 1 diabetes   Risk of ketoacidosis  Discussed symptoms of uncontrolled hyperglycemia.  Discussed long term complications of diabetes to eyes, kidneys and nerves.        RECOMMENDATIONS  Semglee 50 units at bedtime    Novolog 6 units before meals if glucose is 100-200  ADD 2 units for every 50 over glucose 151 mg/dl    DexCom G7  Review DexCom  at all appointments    Follow up 4-6 weeks    Test urine ketones if glucose is over 250 mg/dl.    If ketones in urine, drink water and take Novolog according to scale above.  If ketones in urine with nausea and/or vomiting, go to the emergency department.     Do not drive if glucose is under 90 mg/dl.          [1]  No family history on file.       [1]  No family history on file.

## 2025-06-24 NOTE — PATIENT INSTRUCTIONS
RECOMMENDATIONS  Semglee 50 units at bedtime    Novolog 6 units before meals if glucose is 100-200  ADD 2 units for every 50 over glucose 151 mg/dl    DexCom G7  Review DexCom  at all appointments    Follow up 4-6 weeks    Test urine ketones if glucose is over 250 mg/dl.    If ketones in urine, drink water and take Novolog according to scale above.  If ketones in urine with nausea and/or vomiting, go to the emergency department.     Do not drive if glucose is under 90 mg/dl.

## 2025-06-24 NOTE — PROGRESS NOTES
History Of Present Illness  Jay Rebolledo is a 75 y.o. male     Duration of type 2 diabetes mellitus:  10 years  Complications:  peripheral neuropathy  CAD    Started insulin 2 years ago    Weight loss 7 lbs in 2 months  Urinating every hour.   Excess thirst  Stated hypoglycemia if he does not eat    Semglee 44 units at bedtime  Novolog 6 units before meals, remembering about twice per day    Patient is not testing glucose     Last eye exam:  2023    Past Medical History  He has a past medical history of Atrial fibrillation and flutter, BPH (benign prostatic hyperplasia), Coronary artery disease, Diabetes mellitus type I (Multi), Encounter for screening colonoscopy (04/24/2025), GERD (gastroesophageal reflux disease), Hyperlipidemia, Hypertension, Long term (current) use of anticoagulants, Other conditions influencing health status (03/03/2022), and Peripheral neuropathy.    Surgical History  He has a past surgical history that includes Rotator cuff repair (02/04/2019); Coronary angioplasty (02/04/2019); Coronary artery bypass graft (2014); Hernia repair (02/04/2019); and Coronary angioplasty with stent (1997).     Social History  He reports that he has quit smoking. His smoking use included cigarettes. He has never used smokeless tobacco. He reports current alcohol use of about 1.0 standard drink of alcohol per week. He reports that he does not use drugs.    Family History  Family History[1]    Medications  Current Outpatient Medications   Medication Instructions    atorvastatin (LIPITOR) 80 mg, oral, Nightly    FreeStyle Td 3 [place] misc Use as instructed    FreeStyle Td 3 Sensor device Use as directed    gabapentin (NEURONTIN) 300 mg, oral, 2 times daily    insulin aspart (NOVOLOG FLEXPEN U-100 INSULIN) 6 Units, subcutaneous, 3 times daily before meals, Take as directed per insulin instructions.    insulin glargine-yfgn (SEMGLEE(INSULIN GLARG-YFGN)PEN) 55 Units, subcutaneous, Nightly, Take as directed per  "insulin instructions.    metoprolol succinate XL (TOPROL-XL) 12.5 mg, oral, Nightly, Do not crush or chew.    pen needle, diabetic (BD Jessy 2nd Gen Pen Needle) 32 gauge x 5/32\" needle Once daily    polyethylene glycol (GLYCOLAX, MIRALAX) 17 g, oral, Daily    tamsulosin (FLOMAX) 0.8 mg, oral, Daily    warfarin (Coumadin) 5 mg tablet 1 tablet, Daily (0630)       Allergies  Penicillins    Review of Systems   Constitutional:  Positive for fatigue. Negative for appetite change and fever.   HENT:  Negative for sore throat.         Denies dry mouth   Eyes:  Negative for visual disturbance.   Respiratory:  Negative for cough and shortness of breath.    Cardiovascular:  Negative for chest pain.   Gastrointestinal:  Negative for abdominal pain, constipation, diarrhea, nausea and vomiting.   Endocrine: Negative for polydipsia and polyuria.   Genitourinary:  Positive for difficulty urinating. Negative for frequency.        Prostatism  Erectile dysfunction   Musculoskeletal:  Positive for back pain. Negative for arthralgias.   Skin:  Negative for rash.        dry   Neurological:  Negative for headaches.   Psychiatric/Behavioral:  The patient is not nervous/anxious.          Last Recorded Vitals  Blood pressure 124/73, pulse 63, weight 69.9 kg (154 lb).    Physical Exam  Constitutional:       General: He is not in acute distress.  HENT:      Head: Normocephalic.      Mouth/Throat:      Mouth: Mucous membranes are moist.   Eyes:      Extraocular Movements: Extraocular movements intact.   Neck:      Thyroid: No thyroid mass or thyromegaly.   Cardiovascular:      Pulses:           Radial pulses are 2+ on the right side and 2+ on the left side.   Musculoskeletal:      Right lower leg: No edema.      Left lower leg: No edema.   Feet:      Comments: Flat feet.  Ecchymosis right 2nd-3rd toenails.   Lymphadenopathy:      Cervical: No cervical adenopathy.   Skin:     Comments: No active foot sores   Neurological:      Mental Status: He is " alert.      Motor: No tremor.   Psychiatric:         Mood and Affect: Affect normal.          Relevant Results  Glucose (mg/dL)   Date Value   01/04/2025 295 (H)   11/14/2024 205 (H)   02/08/2024 146 (H)     POC HEMOGLOBIN A1c (%)   Date Value   05/05/2025 13.8 (A)   01/30/2025 14.0 (A)   09/19/2024 12.1 (A)     Bicarbonate (mmol/L)   Date Value   01/04/2025 28   11/14/2024 29   02/08/2024 26     Urea Nitrogen (mg/dL)   Date Value   01/04/2025 19   11/14/2024 18   02/08/2024 15     Creatinine (mg/dL)   Date Value   01/04/2025 0.93   11/14/2024 0.88   02/08/2024 0.85     Lab Results   Component Value Date    CHOL 125 11/14/2024    CHOL 238 (H) 01/30/2024    CHOL 228 (H) 05/05/2022     Lab Results   Component Value Date    HDL 46.7 11/14/2024    HDL 49.4 01/30/2024    HDL 51.4 05/05/2022     Lab Results   Component Value Date    LDLCALC 61 11/14/2024    LDLCALC 139 (H) 01/30/2024     Lab Results   Component Value Date    TRIG 86 11/14/2024    TRIG 249 (H) 01/30/2024    TRIG 113 05/05/2022     Lab Results   Component Value Date    TSH 5.73 (H) 02/07/2024      Latest Reference Range & Units 02/09/24 09:36   C-Peptide 0.7 - 3.9 ng/mL 0.6 (L)   Glutamic Acid Decarb Ab 0.0 - 5.0 IU/mL <5.0       IMPRESSION  INSULIN DEPENDENT DIABETES MELLITUS WITH HYPERGLYCEMIA  Complicated by neuropathy and CAD  Poorly controlled glucose for at least 4 years  Longstanding symptomatic hyperglycemia  No current glucose testing  Evidence of progressed insulin deficiency by c-peptide  No antibody evidence of type 1 diabetes   Risk of ketoacidosis  Discussed symptoms of uncontrolled hyperglycemia.  Discussed long term complications of diabetes to eyes, kidneys and nerves.        RECOMMENDATIONS  Semglee 50 units at bedtime    Novolog 6 units before meals if glucose is 100-200  ADD 2 units for every 50 over glucose 151 mg/dl    DexCom G7  Review DexCom  at all appointments    Follow up 4-6 weeks    Test urine ketones if glucose is over  250 mg/dl.    If ketones in urine, drink water and take Novolog according to scale above.  If ketones in urine with nausea and/or vomiting, go to the emergency department.     Do not drive if glucose is under 90 mg/dl.          [1] No family history on file.

## 2025-06-26 DIAGNOSIS — E10.42 TYPE 1 DIABETES MELLITUS WITH DIABETIC POLYNEUROPATHY: ICD-10-CM

## 2025-06-26 DIAGNOSIS — E11.65 TYPE 2 DIABETES MELLITUS WITH HYPERGLYCEMIA, WITHOUT LONG-TERM CURRENT USE OF INSULIN: ICD-10-CM

## 2025-06-26 RX ORDER — INSULIN GLARGINE-YFGN 100 [IU]/ML
55 INJECTION, SOLUTION SUBCUTANEOUS NIGHTLY
Qty: 60 ML | Refills: 1 | Status: SHIPPED | OUTPATIENT
Start: 2025-06-26

## 2025-06-26 RX ORDER — INSULIN ASPART 100 [IU]/ML
6 INJECTION, SOLUTION INTRAVENOUS; SUBCUTANEOUS
Qty: 30 ML | Refills: 1 | Status: SHIPPED | OUTPATIENT
Start: 2025-06-26 | End: 2025-12-23

## 2025-07-07 DIAGNOSIS — T78.40XD ALLERGY, SUBSEQUENT ENCOUNTER: Primary | ICD-10-CM

## 2025-07-07 RX ORDER — EPINEPHRINE 0.3 MG/.3ML
1 INJECTION SUBCUTANEOUS AS NEEDED
Qty: 2 EACH | Refills: 0 | Status: SHIPPED | OUTPATIENT
Start: 2025-07-07

## 2025-07-16 ENCOUNTER — APPOINTMENT (OUTPATIENT)
Facility: CLINIC | Age: 75
End: 2025-07-16
Payer: MEDICARE

## 2025-07-30 ENCOUNTER — APPOINTMENT (OUTPATIENT)
Dept: ENDOCRINOLOGY | Facility: CLINIC | Age: 75
End: 2025-07-30
Payer: MEDICARE

## 2025-07-30 VITALS
SYSTOLIC BLOOD PRESSURE: 116 MMHG | DIASTOLIC BLOOD PRESSURE: 72 MMHG | HEART RATE: 62 BPM | WEIGHT: 159 LBS | BODY MASS INDEX: 24.18 KG/M2

## 2025-07-30 DIAGNOSIS — E10.65 TYPE 1 DIABETES MELLITUS WITH HYPERGLYCEMIA (MULTI): Primary | ICD-10-CM

## 2025-07-30 PROCEDURE — 1160F RVW MEDS BY RX/DR IN RCRD: CPT | Performed by: INTERNAL MEDICINE

## 2025-07-30 PROCEDURE — 1159F MED LIST DOCD IN RCRD: CPT | Performed by: INTERNAL MEDICINE

## 2025-07-30 PROCEDURE — 3078F DIAST BP <80 MM HG: CPT | Performed by: INTERNAL MEDICINE

## 2025-07-30 PROCEDURE — 3074F SYST BP LT 130 MM HG: CPT | Performed by: INTERNAL MEDICINE

## 2025-07-30 PROCEDURE — 99213 OFFICE O/P EST LOW 20 MIN: CPT | Performed by: INTERNAL MEDICINE

## 2025-07-30 PROCEDURE — G2211 COMPLEX E/M VISIT ADD ON: HCPCS | Performed by: INTERNAL MEDICINE

## 2025-07-30 PROCEDURE — 3046F HEMOGLOBIN A1C LEVEL >9.0%: CPT | Performed by: INTERNAL MEDICINE

## 2025-07-30 ASSESSMENT — ENCOUNTER SYMPTOMS
SHORTNESS OF BREATH: 0
NAUSEA: 0
POLYDIPSIA: 0
CONSTIPATION: 0
VOMITING: 0
NERVOUS/ANXIOUS: 0
ARTHRALGIAS: 1
FREQUENCY: 0
HEADACHES: 0
DIARRHEA: 0
COUGH: 0
FATIGUE: 1
APPETITE CHANGE: 0
ABDOMINAL PAIN: 0
FEVER: 0
SORE THROAT: 0
NUMBNESS: 1

## 2025-07-30 NOTE — PROGRESS NOTES
History Of Present Illness  Jay Rebolledo is a 75 y.o. male     Duration of type 2 diabetes mellitus:  10 years  Complications:  peripheral neuropathy  CAD     Started insulin 2 years ago     Weight regain  Urinating less than every hour.   Thirst improved     Semglee 50 units at bedtime  Novolog 6 units before meals if glucose is 100-200  ADD 2 units for every 50 over glucose 151 mg/dl     Patient started DexCom G7 today     Last eye exam:  2023    Past Medical History  He has a past medical history of Atrial fibrillation and flutter, BPH (benign prostatic hyperplasia), Coronary artery disease, Diabetes mellitus type I (Multi), Encounter for screening colonoscopy (04/24/2025), GERD (gastroesophageal reflux disease), Hyperlipidemia, Hypertension, Long term (current) use of anticoagulants, Other conditions influencing health status (03/03/2022), and Peripheral neuropathy.    Surgical History  He has a past surgical history that includes Rotator cuff repair (02/04/2019); Coronary angioplasty (02/04/2019); Coronary artery bypass graft (2014); Hernia repair (02/04/2019); and Coronary angioplasty with stent (1997).     Social History  He reports that he has quit smoking. His smoking use included cigarettes. He has never used smokeless tobacco. He reports current alcohol use of about 1.0 standard drink of alcohol per week. He reports that he does not use drugs.    Family History  Family History[1]    Medications  Current Outpatient Medications   Medication Instructions    atorvastatin (LIPITOR) 80 mg, oral, Nightly    Dexcom G7  misc Use as instructed    Dexcom G7 Sensor device For continuous glucose monitoring.  Change every 10 days.    EPINEPHrine (EPIPEN) 0.3 mg, intramuscular, As needed, Call 911 after use.    gabapentin (NEURONTIN) 300 mg, oral, 2 times daily    insulin aspart (NOVOLOG FLEXPEN U-100 INSULIN) 6 Units, subcutaneous, 3 times daily before meals, Take as directed per insulin instructions.    insulin  "glargine-yfgn (SEMGLEE(INSULIN GLARG-YFGN)PEN) 55 Units, subcutaneous, Nightly, Take as directed per insulin instructions.    Lantus Solostar U-100 Insulin 50 Units, subcutaneous, Nightly    metoprolol succinate XL (TOPROL-XL) 12.5 mg, oral, Nightly, Do not crush or chew.    pen needle, diabetic (BD Jessy 2nd Gen Pen Needle) 32 gauge x 5/32\" needle Once daily    polyethylene glycol (GLYCOLAX, MIRALAX) 17 g, oral, Daily    tamsulosin (FLOMAX) 0.8 mg, oral, Daily    warfarin (Coumadin) 5 mg tablet 1 tablet, Daily (0630)       Allergies  Penicillins    Review of Systems   Constitutional:  Positive for fatigue. Negative for appetite change and fever.   HENT:  Negative for sore throat.         Denies dry mouth   Eyes:  Negative for visual disturbance.   Respiratory:  Negative for cough and shortness of breath.    Cardiovascular:  Negative for chest pain.   Gastrointestinal:  Negative for abdominal pain, constipation, diarrhea, nausea and vomiting.   Endocrine: Negative for polydipsia and polyuria.   Genitourinary:  Negative for frequency.   Musculoskeletal:  Positive for arthralgias.   Skin:  Negative for rash.   Neurological:  Positive for numbness. Negative for headaches.   Psychiatric/Behavioral:  The patient is not nervous/anxious.          Last Recorded Vitals  Blood pressure 116/72, pulse 62, weight 72.1 kg (159 lb).    Physical Exam  Constitutional:       General: He is not in acute distress.  HENT:      Head: Normocephalic.     Neurological:      Mental Status: He is alert.     Psychiatric:         Mood and Affect: Affect normal.          Relevant Results  Glucose (mg/dL)   Date Value   01/04/2025 295 (H)   11/14/2024 205 (H)   02/08/2024 146 (H)     POC HEMOGLOBIN A1c (%)   Date Value   05/05/2025 13.8 (A)   01/30/2025 14.0 (A)   09/19/2024 12.1 (A)     Bicarbonate (mmol/L)   Date Value   01/04/2025 28   11/14/2024 29   02/08/2024 26     Urea Nitrogen (mg/dL)   Date Value   01/04/2025 19   11/14/2024 18 "   02/08/2024 15     Creatinine (mg/dL)   Date Value   01/04/2025 0.93   11/14/2024 0.88   02/08/2024 0.85     Lab Results   Component Value Date    CHOL 125 11/14/2024    CHOL 238 (H) 01/30/2024    CHOL 228 (H) 05/05/2022     Lab Results   Component Value Date    HDL 46.7 11/14/2024    HDL 49.4 01/30/2024    HDL 51.4 05/05/2022     Lab Results   Component Value Date    LDLCALC 61 11/14/2024    LDLCALC 139 (H) 01/30/2024     Lab Results   Component Value Date    TRIG 86 11/14/2024    TRIG 249 (H) 01/30/2024    TRIG 113 05/05/2022     Lab Results   Component Value Date    TSH 5.73 (H) 02/07/2024       IMPRESSION  INSULIN DEPENDENT DIABETES MELLITUS WITH HYPERGLYCEMIA  Complicated by neuropathy and CAD  Poorly controlled glucose for at least 4 years  Longstanding symptomatic hyperglycemia  Symptoms improved  Appointment today for review of DexCom glucose data, but he just started the device today.       RECOMMENDATIONS  Continue current insulin program  Follow up September  Review DexCom at all appointments  Labs as ordered by Dr. Tariq         [1] No family history on file.

## 2025-07-30 NOTE — LETTER
July 30, 2025     Sean Tariq DO  [address]  [address]  [facility] [address]    Patient: Jay Rebolledo   YOB: 1950   Date of Visit: 7/30/2025       Dear Dr. Sean Tariq DO:    Thank you for referring Jay Rebolledo to me for evaluation. Below are my notes for this consultation.  If you have questions, please do not hesitate to call me. I look forward to following your patient along with you.       Sincerely,     Thomas Yeung MD      CC: No Recipients  ______________________________________________________________________________________    History Of Present Illness  Jay Rebolledo is a 75 y.o. male     Duration of type 2 diabetes mellitus:  10 years  Complications:  peripheral neuropathy  CAD     Started insulin 2 years ago     Weight regain  Urinating less than every hour.   Thirst improved     Semglee 50 units at bedtime  Novolog 6 units before meals if glucose is 100-200  ADD 2 units for every 50 over glucose 151 mg/dl     Patient started DexCom G7 today     Last eye exam:  2023    Past Medical History  He has a past medical history of Atrial fibrillation and flutter, BPH (benign prostatic hyperplasia), Coronary artery disease, Diabetes mellitus type I (Multi), Encounter for screening colonoscopy (04/24/2025), GERD (gastroesophageal reflux disease), Hyperlipidemia, Hypertension, Long term (current) use of anticoagulants, Other conditions influencing health status (03/03/2022), and Peripheral neuropathy.    Surgical History  He has a past surgical history that includes Rotator cuff repair (02/04/2019); Coronary angioplasty (02/04/2019); Coronary artery bypass graft (2014); Hernia repair (02/04/2019); and Coronary angioplasty with stent (1997).     Social History  He reports that he has quit smoking. His smoking use included cigarettes. He has never used smokeless tobacco. He reports current alcohol use of about 1.0 standard drink of alcohol per week. He reports that he does not use drugs.    Family  "History  Family History[1]    Medications  Current Outpatient Medications   Medication Instructions   • atorvastatin (LIPITOR) 80 mg, oral, Nightly   • Dexcom G7  misc Use as instructed   • Dexcom G7 Sensor device For continuous glucose monitoring.  Change every 10 days.   • EPINEPHrine (EPIPEN) 0.3 mg, intramuscular, As needed, Call 911 after use.   • gabapentin (NEURONTIN) 300 mg, oral, 2 times daily   • insulin aspart (NOVOLOG FLEXPEN U-100 INSULIN) 6 Units, subcutaneous, 3 times daily before meals, Take as directed per insulin instructions.   • insulin glargine-yfgn (SEMGLEE(INSULIN GLARG-YFGN)PEN) 55 Units, subcutaneous, Nightly, Take as directed per insulin instructions.   • Lantus Solostar U-100 Insulin 50 Units, subcutaneous, Nightly   • metoprolol succinate XL (TOPROL-XL) 12.5 mg, oral, Nightly, Do not crush or chew.   • pen needle, diabetic (BD Jessy 2nd Gen Pen Needle) 32 gauge x 5/32\" needle Once daily   • polyethylene glycol (GLYCOLAX, MIRALAX) 17 g, oral, Daily   • tamsulosin (FLOMAX) 0.8 mg, oral, Daily   • warfarin (Coumadin) 5 mg tablet 1 tablet, Daily (0630)       Allergies  Penicillins    Review of Systems   Constitutional:  Positive for fatigue. Negative for appetite change and fever.   HENT:  Negative for sore throat.         Denies dry mouth   Eyes:  Negative for visual disturbance.   Respiratory:  Negative for cough and shortness of breath.    Cardiovascular:  Negative for chest pain.   Gastrointestinal:  Negative for abdominal pain, constipation, diarrhea, nausea and vomiting.   Endocrine: Negative for polydipsia and polyuria.   Genitourinary:  Negative for frequency.   Musculoskeletal:  Positive for arthralgias.   Skin:  Negative for rash.   Neurological:  Positive for numbness. Negative for headaches.   Psychiatric/Behavioral:  The patient is not nervous/anxious.          Last Recorded Vitals  Blood pressure 116/72, pulse 62, weight 72.1 kg (159 lb).    Physical Exam  Constitutional: "       General: He is not in acute distress.  HENT:      Head: Normocephalic.     Neurological:      Mental Status: He is alert.     Psychiatric:         Mood and Affect: Affect normal.          Relevant Results  Glucose (mg/dL)   Date Value   01/04/2025 295 (H)   11/14/2024 205 (H)   02/08/2024 146 (H)     POC HEMOGLOBIN A1c (%)   Date Value   05/05/2025 13.8 (A)   01/30/2025 14.0 (A)   09/19/2024 12.1 (A)     Bicarbonate (mmol/L)   Date Value   01/04/2025 28   11/14/2024 29   02/08/2024 26     Urea Nitrogen (mg/dL)   Date Value   01/04/2025 19   11/14/2024 18   02/08/2024 15     Creatinine (mg/dL)   Date Value   01/04/2025 0.93   11/14/2024 0.88   02/08/2024 0.85     Lab Results   Component Value Date    CHOL 125 11/14/2024    CHOL 238 (H) 01/30/2024    CHOL 228 (H) 05/05/2022     Lab Results   Component Value Date    HDL 46.7 11/14/2024    HDL 49.4 01/30/2024    HDL 51.4 05/05/2022     Lab Results   Component Value Date    LDLCALC 61 11/14/2024    LDLCALC 139 (H) 01/30/2024     Lab Results   Component Value Date    TRIG 86 11/14/2024    TRIG 249 (H) 01/30/2024    TRIG 113 05/05/2022     Lab Results   Component Value Date    TSH 5.73 (H) 02/07/2024       IMPRESSION  INSULIN DEPENDENT DIABETES MELLITUS WITH HYPERGLYCEMIA  Complicated by neuropathy and CAD  Poorly controlled glucose for at least 4 years  Longstanding symptomatic hyperglycemia  Symptoms improved  Appointment today for review of DexCom glucose data, but he just started the device today.       RECOMMENDATIONS  Continue current insulin program  Follow up September  Review DexCom at all appointments  Labs as ordered by Dr. Tariq         [1]  No family history on file.       [1]  No family history on file.

## 2025-07-30 NOTE — PATIENT INSTRUCTIONS
RECOMMENDATIONS  Continue current insulin program  Follow up September  Review DexCom at all appointments  Labs as ordered by Dr. Tariq

## 2025-09-23 ENCOUNTER — APPOINTMENT (OUTPATIENT)
Dept: ENDOCRINOLOGY | Facility: CLINIC | Age: 75
End: 2025-09-23
Payer: MEDICARE

## 2025-11-03 ENCOUNTER — APPOINTMENT (OUTPATIENT)
Dept: PRIMARY CARE | Facility: CLINIC | Age: 75
End: 2025-11-03
Payer: MEDICARE